# Patient Record
Sex: FEMALE | Race: WHITE | Employment: OTHER | URBAN - METROPOLITAN AREA
[De-identification: names, ages, dates, MRNs, and addresses within clinical notes are randomized per-mention and may not be internally consistent; named-entity substitution may affect disease eponyms.]

---

## 2017-01-03 ENCOUNTER — LAB ENCOUNTER (OUTPATIENT)
Dept: LAB | Facility: HOSPITAL | Age: 82
End: 2017-01-03
Attending: INTERNAL MEDICINE

## 2017-01-03 DIAGNOSIS — N18.30 CHRONIC KIDNEY DISEASE (CKD), STAGE III (MODERATE) (HCC): ICD-10-CM

## 2017-01-03 DIAGNOSIS — E78.2 MIXED HYPERLIPIDEMIA: Primary | ICD-10-CM

## 2017-01-03 LAB
ALBUMIN SERPL BCP-MCNC: 3.2 G/DL (ref 3.5–4.8)
ALBUMIN/GLOB SERPL: 0.8 {RATIO} (ref 1–2)
ALP SERPL-CCNC: 67 U/L (ref 32–100)
ALT SERPL-CCNC: 9 U/L (ref 14–54)
ANION GAP SERPL CALC-SCNC: 9 MMOL/L (ref 0–18)
AST SERPL-CCNC: 16 U/L (ref 15–41)
BASOPHILS # BLD: 0 K/UL (ref 0–0.2)
BASOPHILS NFR BLD: 1 %
BILIRUB SERPL-MCNC: 0.4 MG/DL (ref 0.3–1.2)
BUN SERPL-MCNC: 34 MG/DL (ref 8–20)
BUN/CREAT SERPL: 26.4 (ref 10–20)
CALCIUM SERPL-MCNC: 8.8 MG/DL (ref 8.5–10.5)
CHLORIDE SERPL-SCNC: 106 MMOL/L (ref 95–110)
CHOLEST SERPL-MCNC: 148 MG/DL (ref 110–200)
CO2 SERPL-SCNC: 25 MMOL/L (ref 22–32)
CREAT SERPL-MCNC: 1.29 MG/DL (ref 0.5–1.5)
CREAT UR-MCNC: 95.4 MG/DL
EOSINOPHIL # BLD: 0 K/UL (ref 0–0.7)
EOSINOPHIL NFR BLD: 1 %
ERYTHROCYTE [DISTWIDTH] IN BLOOD BY AUTOMATED COUNT: 15.4 % (ref 11–15)
GLOBULIN PLAS-MCNC: 3.8 G/DL (ref 2.5–3.7)
GLUCOSE SERPL-MCNC: 104 MG/DL (ref 70–99)
HCT VFR BLD AUTO: 35.9 % (ref 35–48)
HDLC SERPL-MCNC: 30 MG/DL
HGB BLD-MCNC: 11.8 G/DL (ref 12–16)
LDLC SERPL CALC-MCNC: 85 MG/DL (ref 0–99)
LYMPHOCYTES # BLD: 1.5 K/UL (ref 1–4)
LYMPHOCYTES NFR BLD: 20 %
MCH RBC QN AUTO: 30.9 PG (ref 27–32)
MCHC RBC AUTO-ENTMCNC: 32.9 G/DL (ref 32–37)
MCV RBC AUTO: 93.8 FL (ref 80–100)
MICROALBUMIN UR-MCNC: 2 MG/DL (ref 0–1.8)
MICROALBUMIN/CREAT UR: 21 MG/G{CREAT} (ref 0–20)
MONOCYTES # BLD: 0.8 K/UL (ref 0–1)
MONOCYTES NFR BLD: 10 %
NEUTROPHILS # BLD AUTO: 5.2 K/UL (ref 1.8–7.7)
NEUTROPHILS NFR BLD: 69 %
NONHDLC SERPL-MCNC: 118 MG/DL
OSMOLALITY UR CALC.SUM OF ELEC: 298 MOSM/KG (ref 275–295)
PLATELET # BLD AUTO: 167 K/UL (ref 140–400)
PMV BLD AUTO: 9.5 FL (ref 7.4–10.3)
POTASSIUM SERPL-SCNC: 4.4 MMOL/L (ref 3.3–5.1)
PROT SERPL-MCNC: 7 G/DL (ref 5.9–8.4)
RBC # BLD AUTO: 3.83 M/UL (ref 3.7–5.4)
SODIUM SERPL-SCNC: 140 MMOL/L (ref 136–144)
TRIGL SERPL-MCNC: 165 MG/DL (ref 1–149)
TSH SERPL-ACNC: 5.78 UIU/ML (ref 0.34–5.6)
WBC # BLD AUTO: 7.5 K/UL (ref 4–11)

## 2017-01-03 PROCEDURE — 82570 ASSAY OF URINE CREATININE: CPT

## 2017-01-03 PROCEDURE — 80053 COMPREHEN METABOLIC PANEL: CPT

## 2017-01-03 PROCEDURE — 82043 UR ALBUMIN QUANTITATIVE: CPT

## 2017-01-03 PROCEDURE — 36415 COLL VENOUS BLD VENIPUNCTURE: CPT

## 2017-01-03 PROCEDURE — 85025 COMPLETE CBC W/AUTO DIFF WBC: CPT

## 2017-01-03 PROCEDURE — 80061 LIPID PANEL: CPT

## 2017-01-03 PROCEDURE — 84443 ASSAY THYROID STIM HORMONE: CPT

## 2017-07-06 ENCOUNTER — LAB REQUISITION (OUTPATIENT)
Dept: LAB | Facility: HOSPITAL | Age: 82
End: 2017-07-06
Payer: MEDICARE

## 2017-07-06 DIAGNOSIS — E78.2 MIXED HYPERLIPIDEMIA: ICD-10-CM

## 2017-07-06 DIAGNOSIS — Z00.01 ENCOUNTER FOR GENERAL ADULT MEDICAL EXAMINATION WITH ABNORMAL FINDINGS: ICD-10-CM

## 2017-07-06 DIAGNOSIS — E03.9 HYPOTHYROIDISM: ICD-10-CM

## 2017-07-06 LAB
ALBUMIN SERPL BCP-MCNC: 3.5 G/DL (ref 3.5–4.8)
ALBUMIN/GLOB SERPL: 0.9 {RATIO} (ref 1–2)
ALP SERPL-CCNC: 60 U/L (ref 32–100)
ALT SERPL-CCNC: 9 U/L (ref 14–54)
ANION GAP SERPL CALC-SCNC: 8 MMOL/L (ref 0–18)
AST SERPL-CCNC: 19 U/L (ref 15–41)
BASOPHILS # BLD: 0 K/UL (ref 0–0.2)
BASOPHILS NFR BLD: 0 %
BILIRUB SERPL-MCNC: 0.6 MG/DL (ref 0.3–1.2)
BUN SERPL-MCNC: 36 MG/DL (ref 8–20)
BUN/CREAT SERPL: 28.3 (ref 10–20)
CALCIUM SERPL-MCNC: 9.3 MG/DL (ref 8.5–10.5)
CHLORIDE SERPL-SCNC: 108 MMOL/L (ref 95–110)
CHOLEST SERPL-MCNC: 138 MG/DL (ref 110–200)
CO2 SERPL-SCNC: 25 MMOL/L (ref 22–32)
CREAT SERPL-MCNC: 1.27 MG/DL (ref 0.5–1.5)
EOSINOPHIL # BLD: 0 K/UL (ref 0–0.7)
EOSINOPHIL NFR BLD: 0 %
ERYTHROCYTE [DISTWIDTH] IN BLOOD BY AUTOMATED COUNT: 16.5 % (ref 11–15)
GLOBULIN PLAS-MCNC: 3.8 G/DL (ref 2.5–3.7)
GLUCOSE SERPL-MCNC: 97 MG/DL (ref 70–99)
HCT VFR BLD AUTO: 35.8 % (ref 35–48)
HDLC SERPL-MCNC: 34 MG/DL
HGB BLD-MCNC: 11.7 G/DL (ref 12–16)
LDLC SERPL CALC-MCNC: 82 MG/DL (ref 0–99)
LYMPHOCYTES # BLD: 1.6 K/UL (ref 1–4)
LYMPHOCYTES NFR BLD: 26 %
MCH RBC QN AUTO: 30.6 PG (ref 27–32)
MCHC RBC AUTO-ENTMCNC: 32.5 G/DL (ref 32–37)
MCV RBC AUTO: 94 FL (ref 80–100)
MONOCYTES # BLD: 0.8 K/UL (ref 0–1)
MONOCYTES NFR BLD: 13 %
NEUTROPHILS # BLD AUTO: 3.6 K/UL (ref 1.8–7.7)
NEUTROPHILS NFR BLD: 60 %
NONHDLC SERPL-MCNC: 104 MG/DL
OSMOLALITY UR CALC.SUM OF ELEC: 300 MOSM/KG (ref 275–295)
PLATELET # BLD AUTO: 142 K/UL (ref 140–400)
PMV BLD AUTO: 10.4 FL (ref 7.4–10.3)
POTASSIUM SERPL-SCNC: 4 MMOL/L (ref 3.3–5.1)
PROT SERPL-MCNC: 7.3 G/DL (ref 5.9–8.4)
RBC # BLD AUTO: 3.81 M/UL (ref 3.7–5.4)
SODIUM SERPL-SCNC: 141 MMOL/L (ref 136–144)
TRIGL SERPL-MCNC: 109 MG/DL (ref 1–149)
TSH SERPL-ACNC: 3.6 UIU/ML (ref 0.45–5.33)
WBC # BLD AUTO: 6 K/UL (ref 4–11)

## 2017-07-06 PROCEDURE — 80061 LIPID PANEL: CPT | Performed by: INTERNAL MEDICINE

## 2017-07-06 PROCEDURE — 80053 COMPREHEN METABOLIC PANEL: CPT | Performed by: INTERNAL MEDICINE

## 2017-07-06 PROCEDURE — 84443 ASSAY THYROID STIM HORMONE: CPT | Performed by: INTERNAL MEDICINE

## 2017-07-06 PROCEDURE — 82306 VITAMIN D 25 HYDROXY: CPT | Performed by: INTERNAL MEDICINE

## 2017-07-06 PROCEDURE — 85025 COMPLETE CBC W/AUTO DIFF WBC: CPT | Performed by: INTERNAL MEDICINE

## 2017-07-07 LAB — 25(OH)D3 SERPL-MCNC: 43.3 NG/ML

## 2018-07-16 ENCOUNTER — HOSPITAL ENCOUNTER (OUTPATIENT)
Dept: ULTRASOUND IMAGING | Facility: HOSPITAL | Age: 83
Discharge: HOME | End: 2018-07-16
Attending: INTERNAL MEDICINE
Payer: MEDICARE

## 2018-07-16 DIAGNOSIS — R60.9 SWELLING: ICD-10-CM

## 2018-07-16 PROCEDURE — 93971 EXTREMITY STUDY: CPT | Performed by: INTERNAL MEDICINE

## 2018-08-13 ENCOUNTER — LAB REQUISITION (OUTPATIENT)
Dept: LAB | Facility: HOSPITAL | Age: 83
End: 2018-08-13
Payer: MEDICARE

## 2018-08-13 DIAGNOSIS — E78.2 MIXED HYPERLIPIDEMIA: ICD-10-CM

## 2018-08-13 DIAGNOSIS — N18.30 CHRONIC KIDNEY DISEASE, STAGE III (MODERATE) (HCC): ICD-10-CM

## 2018-08-13 LAB
ALBUMIN SERPL BCP-MCNC: 3.3 G/DL (ref 3.5–4.8)
ALBUMIN/GLOB SERPL: 0.9 {RATIO} (ref 1–2)
ALP SERPL-CCNC: 55 U/L (ref 32–100)
ALT SERPL-CCNC: 12 U/L (ref 14–54)
ANION GAP SERPL CALC-SCNC: 9 MMOL/L (ref 0–18)
AST SERPL-CCNC: 21 U/L (ref 15–41)
BASOPHILS # BLD: 0.1 K/UL (ref 0–0.2)
BASOPHILS NFR BLD: 1 %
BILIRUB SERPL-MCNC: 0.5 MG/DL (ref 0.3–1.2)
BILIRUB UR QL: NEGATIVE
BUN SERPL-MCNC: 32 MG/DL (ref 8–20)
BUN/CREAT SERPL: 27.6 (ref 10–20)
CALCIUM SERPL-MCNC: 9 MG/DL (ref 8.5–10.5)
CHLORIDE SERPL-SCNC: 107 MMOL/L (ref 95–110)
CHOLEST SERPL-MCNC: 146 MG/DL (ref 110–200)
CO2 SERPL-SCNC: 24 MMOL/L (ref 22–32)
COLOR UR: YELLOW
CREAT SERPL-MCNC: 1.16 MG/DL (ref 0.5–1.5)
CREAT UR-MCNC: 30.8 MG/DL
EOSINOPHIL # BLD: 0 K/UL (ref 0–0.7)
EOSINOPHIL NFR BLD: 1 %
ERYTHROCYTE [DISTWIDTH] IN BLOOD BY AUTOMATED COUNT: 17.6 % (ref 11–15)
GLOBULIN PLAS-MCNC: 3.8 G/DL (ref 2.5–3.7)
GLUCOSE SERPL-MCNC: 98 MG/DL (ref 70–99)
GLUCOSE UR-MCNC: NEGATIVE MG/DL
HCT VFR BLD AUTO: 34.9 % (ref 35–48)
HDLC SERPL-MCNC: 34 MG/DL
HGB BLD-MCNC: 11.4 G/DL (ref 12–16)
HGB UR QL STRIP.AUTO: NEGATIVE
KETONES UR-MCNC: NEGATIVE MG/DL
LDLC SERPL CALC-MCNC: 78 MG/DL (ref 0–99)
LYMPHOCYTES # BLD: 2 K/UL (ref 1–4)
LYMPHOCYTES NFR BLD: 27 %
MCH RBC QN AUTO: 31.2 PG (ref 27–32)
MCHC RBC AUTO-ENTMCNC: 32.8 G/DL (ref 32–37)
MCV RBC AUTO: 95.2 FL (ref 80–100)
MICROALBUMIN UR-MCNC: 1.9 MG/DL (ref 0–1.8)
MICROALBUMIN/CREAT UR: 61.7 MG/G{CREAT} (ref 0–20)
MONOCYTES # BLD: 1 K/UL (ref 0–1)
MONOCYTES NFR BLD: 13 %
NEUTROPHILS # BLD AUTO: 4.4 K/UL (ref 1.8–7.7)
NEUTROPHILS NFR BLD: 59 %
NITRITE UR QL STRIP.AUTO: NEGATIVE
NONHDLC SERPL-MCNC: 112 MG/DL
OSMOLALITY UR CALC.SUM OF ELEC: 297 MOSM/KG (ref 275–295)
PH UR: 6 [PH] (ref 5–8)
PLATELET # BLD AUTO: 148 K/UL (ref 140–400)
PMV BLD AUTO: 10.5 FL (ref 7.4–10.3)
POTASSIUM SERPL-SCNC: 4.1 MMOL/L (ref 3.3–5.1)
PROT SERPL-MCNC: 7.1 G/DL (ref 5.9–8.4)
PROT UR-MCNC: NEGATIVE MG/DL
RBC # BLD AUTO: 3.67 M/UL (ref 3.7–5.4)
RBC #/AREA URNS AUTO: 1 /HPF
SODIUM SERPL-SCNC: 140 MMOL/L (ref 136–144)
SP GR UR STRIP: 1.01 (ref 1–1.03)
TRIGL SERPL-MCNC: 170 MG/DL (ref 1–149)
TSH SERPL-ACNC: 3.43 UIU/ML (ref 0.45–5.33)
UROBILINOGEN UR STRIP-ACNC: <2
VIT C UR-MCNC: NEGATIVE MG/DL
WBC # BLD AUTO: 7.4 K/UL (ref 4–11)
WBC #/AREA URNS AUTO: 64 /HPF

## 2018-08-13 PROCEDURE — 82570 ASSAY OF URINE CREATININE: CPT | Performed by: INTERNAL MEDICINE

## 2018-08-13 PROCEDURE — 84443 ASSAY THYROID STIM HORMONE: CPT | Performed by: INTERNAL MEDICINE

## 2018-08-13 PROCEDURE — 80053 COMPREHEN METABOLIC PANEL: CPT | Performed by: INTERNAL MEDICINE

## 2018-08-13 PROCEDURE — 85025 COMPLETE CBC W/AUTO DIFF WBC: CPT | Performed by: INTERNAL MEDICINE

## 2018-08-13 PROCEDURE — 87086 URINE CULTURE/COLONY COUNT: CPT | Performed by: INTERNAL MEDICINE

## 2018-08-13 PROCEDURE — 81001 URINALYSIS AUTO W/SCOPE: CPT | Performed by: INTERNAL MEDICINE

## 2018-08-13 PROCEDURE — 87088 URINE BACTERIA CULTURE: CPT | Performed by: INTERNAL MEDICINE

## 2018-08-13 PROCEDURE — 80061 LIPID PANEL: CPT | Performed by: INTERNAL MEDICINE

## 2018-08-13 PROCEDURE — 82043 UR ALBUMIN QUANTITATIVE: CPT | Performed by: INTERNAL MEDICINE

## 2018-08-13 PROCEDURE — 87186 SC STD MICRODIL/AGAR DIL: CPT | Performed by: INTERNAL MEDICINE

## 2018-09-23 ENCOUNTER — APPOINTMENT (OUTPATIENT)
Dept: GENERAL RADIOLOGY | Facility: HOSPITAL | Age: 83
DRG: 481 | End: 2018-09-23
Attending: EMERGENCY MEDICINE
Payer: MEDICARE

## 2018-09-23 ENCOUNTER — HOSPITAL ENCOUNTER (INPATIENT)
Facility: HOSPITAL | Age: 83
LOS: 7 days | Discharge: NURSING, REHAB OR LONG-TERM CARE | DRG: 481 | End: 2018-09-30
Attending: EMERGENCY MEDICINE | Admitting: INTERNAL MEDICINE
Payer: MEDICARE

## 2018-09-23 DIAGNOSIS — I82.401 DEEP VEIN THROMBOSIS (DVT) OF RIGHT LOWER EXTREMITY, UNSPECIFIED CHRONICITY, UNSPECIFIED VEIN (HCC): ICD-10-CM

## 2018-09-23 DIAGNOSIS — S72.002A CLOSED FRACTURE OF LEFT HIP, INITIAL ENCOUNTER (HCC): Primary | ICD-10-CM

## 2018-09-23 DIAGNOSIS — D50.9 IRON DEFICIENCY ANEMIA, UNSPECIFIED IRON DEFICIENCY ANEMIA TYPE: ICD-10-CM

## 2018-09-23 PROCEDURE — 71045 X-RAY EXAM CHEST 1 VIEW: CPT | Performed by: EMERGENCY MEDICINE

## 2018-09-23 PROCEDURE — 73502 X-RAY EXAM HIP UNI 2-3 VIEWS: CPT | Performed by: EMERGENCY MEDICINE

## 2018-09-23 RX ORDER — DEXTROSE, SODIUM CHLORIDE, AND POTASSIUM CHLORIDE 5; .45; .15 G/100ML; G/100ML; G/100ML
INJECTION INTRAVENOUS CONTINUOUS
Status: DISCONTINUED | OUTPATIENT
Start: 2018-09-23 | End: 2018-09-24

## 2018-09-23 RX ORDER — SODIUM CHLORIDE 9 MG/ML
INJECTION, SOLUTION INTRAVENOUS ONCE
Status: COMPLETED | OUTPATIENT
Start: 2018-09-23 | End: 2018-09-23

## 2018-09-23 RX ORDER — MORPHINE SULFATE 2 MG/ML
1 INJECTION, SOLUTION INTRAMUSCULAR; INTRAVENOUS EVERY 2 HOUR PRN
Status: DISCONTINUED | OUTPATIENT
Start: 2018-09-23 | End: 2018-09-27

## 2018-09-23 RX ORDER — METOPROLOL SUCCINATE 25 MG/1
25 TABLET, EXTENDED RELEASE ORAL DAILY
COMMUNITY

## 2018-09-23 RX ORDER — LEVOTHYROXINE SODIUM 0.03 MG/1
25 TABLET ORAL
COMMUNITY

## 2018-09-23 RX ORDER — BUPIVACAINE HYDROCHLORIDE 5 MG/ML
30 INJECTION, SOLUTION EPIDURAL; INTRACAUDAL ONCE
Status: COMPLETED | OUTPATIENT
Start: 2018-09-23 | End: 2018-09-23

## 2018-09-23 RX ORDER — ARIPIPRAZOLE 15 MG/1
10 TABLET ORAL DAILY
Status: ON HOLD | COMMUNITY
End: 2018-09-30

## 2018-09-23 RX ORDER — LISINOPRIL 10 MG/1
10 TABLET ORAL DAILY
Status: ON HOLD | COMMUNITY
End: 2018-09-30

## 2018-09-23 NOTE — ED INITIAL ASSESSMENT (HPI)
Patient was bending over when she fell onto her left hip. Patient complains of left hip pain.   +shortening and rotation

## 2018-09-24 RX ORDER — SODIUM CHLORIDE 9 MG/ML
INJECTION, SOLUTION INTRAVENOUS ONCE
Status: COMPLETED | OUTPATIENT
Start: 2018-09-24 | End: 2018-09-24

## 2018-09-24 RX ORDER — 0.9 % SODIUM CHLORIDE 0.9 %
VIAL (ML) INJECTION
Status: COMPLETED
Start: 2018-09-24 | End: 2018-09-24

## 2018-09-24 RX ORDER — ARIPIPRAZOLE 15 MG/1
10 TABLET ORAL DAILY
Status: DISCONTINUED | OUTPATIENT
Start: 2018-09-24 | End: 2018-09-30

## 2018-09-24 RX ORDER — LEVOTHYROXINE SODIUM 0.03 MG/1
25 TABLET ORAL
Status: DISCONTINUED | OUTPATIENT
Start: 2018-09-24 | End: 2018-09-30

## 2018-09-24 RX ORDER — ENOXAPARIN SODIUM 100 MG/ML
30 INJECTION SUBCUTANEOUS EVERY 24 HOURS
Status: DISCONTINUED | OUTPATIENT
Start: 2018-09-24 | End: 2018-09-26

## 2018-09-24 RX ORDER — DEXTROSE MONOHYDRATE 25 G/50ML
50 INJECTION, SOLUTION INTRAVENOUS AS NEEDED
Status: DISCONTINUED | OUTPATIENT
Start: 2018-09-24 | End: 2018-09-30

## 2018-09-24 RX ORDER — METOPROLOL SUCCINATE 25 MG/1
25 TABLET, EXTENDED RELEASE ORAL DAILY
Status: DISCONTINUED | OUTPATIENT
Start: 2018-09-24 | End: 2018-09-30

## 2018-09-24 RX ORDER — SODIUM CHLORIDE 9 MG/ML
INJECTION, SOLUTION INTRAVENOUS
Status: COMPLETED
Start: 2018-09-24 | End: 2018-09-24

## 2018-09-24 RX ORDER — DEXTROSE MONOHYDRATE 25 G/50ML
50 INJECTION, SOLUTION INTRAVENOUS AS NEEDED
Status: DISCONTINUED | OUTPATIENT
Start: 2018-09-24 | End: 2018-09-27

## 2018-09-24 NOTE — H&P
[facility]    PATIENT'S NAME: Ernesto Whyte   ATTENDING PHYSICIAN: Aureliano Mina MD   PATIENT ACCOUNT#:   [id number]    LOCATION:  [address] [place] [address] RECORD #:   [id number]       YOB: 1914  ADMISSION DATE:       09/23/201 HEENT:  Head:  Atraumatic. Eyes:  EOMI. PERRL. Mouth:  Tongue and uvula midline without erythema or exudate. NECK:  Supple without lymphadenopathy or bruits. LUNGS:  Clear to auscultation. HEART:  Regular rhythm.   S1 and S2 normal.   ABDOMEN: treatment. Dictated By Steven Montilla MD  d: 09/24/2018 07:13:34  t: 09/24/2018 07:19:45  [facility] [id number]  [facility]

## 2018-09-24 NOTE — CONSULTS
[facility]    PATIENT'S NAME: Jasonville Nannette   ATTENDING PHYSICIAN: Aureliano Parada MD   CONSULTING PHYSICIAN: Domenico Crowe MD   PATIENT ACCOUNT#:   [id number]    LOCATION:  [address] [place] [address] #:   [id number]       DATE OF BIR Decreased range of motion of the ankle with regard to dorsiflexion and plantar flexion. Good color and capillary refill are noted. LABORATORY DATA:  Her white blood count is 9.2, hemoglobin 9.7, hematocrit 28.9, platelet count is 755. Her INR is 1.5. cardiopulmonary, GI/, and/or cerebral problems including stroke despite medical and anesthesia clearance; anesthetic-related complications despite anesthesia clearance.; the need for prolonged protective weightbearing and rehabilitation, etc., and absolu

## 2018-09-24 NOTE — CM/SW NOTE
NEHAL met with the patient and her daughter  at bedside. The patient lives alone in a single story house, 1 step to enter. The patient responds being independent prior to admission with all ADL's and ambulation. Patient denies use of any DME.  The patient has

## 2018-09-24 NOTE — ED PROVIDER NOTES
Patient Seen in: [facility] AND [facility] Emergency Department    History   Patient presents with:  Hip Pain      HPI    Patient presents to the ED complaining of left hip pain after falling in her kitchen today.   She states she was bending over to  some Substance and Sexual Activity      Alcohol use: Not on file      Drug use: Not on file      Sexual activity: Not on file    Other Topics      Concerns:        Not on file    Social History Narrative      Not on file      ROS  Pertinent Positives: Hip pain 1.8 (*)     All other components within normal limits   URINALYSIS WITH CULTURE REFLEX - Abnormal; Notable for the following components:    Blood Urine Small (*)     Leukocyte Esterase Urine Moderate (*)     WBC Urine 29 (*)     Bacteria Urine Moderate (*) Margi Alvares MD on 9/23/2018 at 19:30     Approved by (CST): Margi Alvares MD on 9/23/2018 at 19:34          ED Medications Administered:   Medications   dextrose 5 % and 0.45 % NaCl with KCl 20 mEq infusion ( Intravenous New Bag 9/23/ the procedure well, performed by myself. Ultrasound images stored on the ultrasound database.     Condition upon leaving the department: Stable    Disposition and Plan     Clinical Impression:  Closed fracture of left hip, initial encounter ([facility] [address]  (primary

## 2018-09-25 RX ORDER — SODIUM CHLORIDE 9 MG/ML
INJECTION, SOLUTION INTRAVENOUS CONTINUOUS
Status: DISCONTINUED | OUTPATIENT
Start: 2018-09-26 | End: 2018-09-30

## 2018-09-25 RX ORDER — 0.9 % SODIUM CHLORIDE 0.9 %
VIAL (ML) INJECTION
Status: COMPLETED
Start: 2018-09-25 | End: 2018-09-25

## 2018-09-25 NOTE — CM/SW NOTE
SW met with patient and her family at bedside. List of rehab facilities provided to them again. Daughter is stating that she will make a decision regarding rehab facility tomorrow.  Initially the referral has been sent to [facility] ([facility]), patient and

## 2018-09-25 NOTE — PLAN OF CARE
DISCHARGE PLANNING    • Discharge to home or other facility with appropriate resources Progressing    sw on case.  Plan [facility] vs [facility]-awaiting ana lilia        HEMATOLOGIC - ADULT    • Maintains hematologic stability Progressing    • Free from bleedin pm w/Dr. Tommie Frances

## 2018-09-25 NOTE — PROGRESS NOTES
[facility]    Progress Note    Wilber Noyola Patient Status:  Inpatient    1914 MRN [id number]   Location [facility] 4W/SW/SE Attending Rodger Agustin MD   Hosp Day # 2 PCP Liiva Briones MD       Subjective:   Wilber Noyola is ALT   --   11*   --    BILT   --   0.5   --    TP   --   5.8*   --        No results for input(s): LIP, PATRICA in the last 168 hours. No results for input(s): TROP, CK in the last 168 hours.     Recent Labs   Lab  09/23/18   1846  09/24/18   0445   INR  1 09/24/2018 38 (L) >=60 Final   09/23/2018 41 (L) >=60 Final   08/13/2018 38 (L) >=60 Final   07/06/2017 38 (L) >=60 Final   01/03/2017 38 (L) >=60 Final     WBC   Date Value Ref Range Status   09/25/2018 8.3 4.0 - 11.0 K/UL Final   09/24/2018 9.2 4.0 - 1 Rhythm Low voltage in precordial leads.  ABNORMAL When compared with ECG of 05/01/2009 03:14:33 No significant changes have occurred Electronically signed on 09/23/2018 at 21:07 by Reyna Jeter MD  9/25/2018

## 2018-09-25 NOTE — CM/SW NOTE
CTL note: Current working DRG is 536. CTL will not enroll in Episode Connect - pt is on OR schedule for 9/26 pending medical and anesthesia clearance. CTL will continue to monitor working  DRG.

## 2018-09-25 NOTE — PROGRESS NOTES
[facility] Obstetrics and Gynecology    [address] [place] [address]    Phone:  [phone]       Thank You for choosing us for your health care visit. We are glad to serve you and happy to provide you with this summary of your visit. Please help us to ensure we have accurate records. If you find anything that needs to be changed, please let our staff know as soon as possible.          Your Demographic Information     Patient Name Sex     Coleen Owen Female 1969       Ethnic Group Patient Race    Not of  or  Origin White      Your Visit Details     Date & Time Provider Department    2017 4:00 PM Kylee Schulz,  [facility]etrics [facility]      Your Upcoming Appointment*(Max 10)     Wednesday May 31, 2017  3:00 PM CDT   IV Therapy with [facility] CHEMO CHAIR 1, [facility] INFUSION RN   [facility] ([facility])    [address] [place] [address]   [phone]            2017 12:30 PM CDT   US PELVIS COMPLETE with OSW US MFM/OB 1   [facility] Ultrasound - Obstetrics ([facility])    [address] [place] [address] [phone]            2017  2:30 PM CDT   Follow-up Visit with Sandra Haynes MD   [facility] ([facility])    [address] [place] [address]   [phone]              Your To Do List     Future Orders Please Complete On or Around Expires    US PELVIS COMPLETE NON OB  May 26, 2017 Aug 24, 2017      Conditions Discussed Today or Order-Related Diagnoses        Comments    Menorrhagia with regular cycle    -  Primary       Your Vitals Were     BP Pulse Height Weight LMP BMI    134/82 90 5' 3\" (1.6 m) 231 lb 0.7 oz (104.8 kg) 2017 (Exact Date) 40.93 kg/m2    Smoking Status                   Former Smoker      [facility]    Progress Note    Monica Head Patient Status:  Inpatient    1914 MRN [id number]   Location [facility] 4W/SW/SE Attending Jose Luis Chaudhary MD   Hosp Day # 2 PCP Jose M Hernandez MD     Date of Admission:  2018       Medications Prescribed or Re-Ordered Today     None      Your Current Medications Are        Disp Refills Start End    sumatriptan (IMITREX) 100 MG tablet 9 tablet 0 5/1/2017     Sig - Route: Take 1 tablet by mouth as needed for Migraine. - Oral    Class: Eprescribe    FLUoxetine (PROZAC) 20 MG capsule 270 capsule 0 5/1/2017     Sig - Route: Take 3 capsules by mouth daily. - Oral    Class: Eprescribe    busPIRone (BUSPAR) 15 MG tablet 84 tablet 0 5/24/2017     Sig: Take one tab twice a day times one week po. Then increase to two tabs twice per day.    Class: Eprescribe      Allergies     Sulfa Antibiotics       Immunizations History as of 5/26/2017     Name Date    Influenza 11/20/2007    Influenza A novel H1N1 12/17/2009    PPD 9/8/2015  3:00 PM    Tdap 4/19/2012      Problem List as of 5/26/2017     Anemia    Depression    Fatigue    ARGENIS (iron deficiency anemia)    Obesity    Back strain    Anxiety disorder    Migraines    Iron deficiency anemia due to chronic blood loss              Patient Instructions    Novasure endometrial ablation      Endometrial Ablation  Endometrial ablation is an outpatient surgery that can reduce or stop heavy menstrual bleeding. Ablation destroys the lining of the uterus. This surgery is for women who do not want to have any more children and who have not yet entered menopause. It should not be used by women with endometrial hyperplasia or cancer of the uterus.  Treatment takes less than an hour, and you can go home later that day.  Preparing for surgery  · You may be given medicine by mouth or injection for a few weeks or months before your ablation. This thins the lining of the uterus and reduces bleeding.  · Your health care provider may recommend other procedures to check the inside of your uterus before the ablation is done.   · The day before surgery, you may be given medicine or a special substance (laminaria) may be put into your cervix (the opening to the uterus). This  (DEX4) oral liquid 15 g 15 g Oral Q15 Min PRN   Insulin Aspart Pen (NOVOLOG) 100 UNIT/ML flexpen 1-5 Units 1-5 Units Subcutaneous TID CC   Enoxaparin Sodium (LOVENOX) 30 MG/0.3ML injection 30 mg 30 mg Subcutaneous Q24H   [COMPLETED] Sodium Chloride 0.9 % s Dictated by (CST): Nagi Alvares MD on 9/23/2018 at 19:34     Approved by (CST): Nagi Alvares MD on 9/23/2018 at 19:35          Xr Hip W Or Wo Pelvis 2 Or 3 Views, Left (cpt=73502)    Result Date: 9/23/2018  CONCLUSION:  1.  Alyssa Enrique widens the opening.  · To help prevent problems with anesthesia, do not eat or drink anything 10 hours before surgery.  Your surgery     Destroying the lining with heat, freezing, or electric current prevents the lining from growing back.      · You’ll be given anesthesia so you stay comfortable and relaxed and feel no pain during surgery.  · Then, your uterus may be filled with fluid. This puts pressure on the lining to help reduce bleeding. It also allows your health care provider to see inside your uterus.  · Next your health care provider puts a small telescope-like instrument through the cervix. This scope may be connected to a video monitor. This helps your health care provider see and control the ablation process. At the end of the scope, a device using heat, freezing, or electric current destroys the uterine lining. Instead of the scope, your health care provider may use a device that both expands and destroys the uterine lining. After being inserted into your uterus, it also uses heat or other energy to destroy the lining. Your health care provider will choose the device that’s best for you.  Your recovery  · You may have cramping or aching in your abdomen after surgery. Your health care provider can give you pain medicine.  · You may also have a bloody or watery discharge or bleeding for days or weeks. Use sanitary pads, not tampons.  · Don’t have sexual intercourse or play active sports for 2 weeks after surgery.  · You can likely return to work in 2 days.  · Ask your health care provider about using contraception after an ablation.  · Your health care provider will see you in about 6 weeks to be sure you’re healing well.  Call your health care provider if you have any of the following after surgery:  · Persistent or increased abdominal pain  · Shortness of breath  · Heavy vaginal bleeding  · Fever over 100.4°F (38°C) or chills  · Nausea  · Frequent urination for 24 hours   © 6917-4099 [facility]  [employer], [employer]. [address] [place] [address], [place] [address]. All rights reserved. This information is not intended as a substitute for professional medical care. Always follow your healthcare professional's instructions.

## 2018-09-26 ENCOUNTER — ANESTHESIA (OUTPATIENT)
Dept: SURGERY | Facility: HOSPITAL | Age: 83
DRG: 481 | End: 2018-09-26
Payer: MEDICARE

## 2018-09-26 ENCOUNTER — APPOINTMENT (OUTPATIENT)
Dept: GENERAL RADIOLOGY | Facility: HOSPITAL | Age: 83
DRG: 481 | End: 2018-09-26
Attending: ORTHOPAEDIC SURGERY
Payer: MEDICARE

## 2018-09-26 ENCOUNTER — ANESTHESIA EVENT (OUTPATIENT)
Dept: SURGERY | Facility: HOSPITAL | Age: 83
DRG: 481 | End: 2018-09-26
Payer: MEDICARE

## 2018-09-26 PROCEDURE — 73502 X-RAY EXAM HIP UNI 2-3 VIEWS: CPT | Performed by: ORTHOPAEDIC SURGERY

## 2018-09-26 PROCEDURE — 76001 XR C-ARM FLUORO >1 HOUR  (CPT=76001): CPT | Performed by: ORTHOPAEDIC SURGERY

## 2018-09-26 PROCEDURE — 2W6MX0Z TRACTION OF LEFT LOWER EXTREMITY USING TRACTION APPARATUS: ICD-10-PCS | Performed by: ORTHOPAEDIC SURGERY

## 2018-09-26 PROCEDURE — 0QS706Z REPOSITION LEFT UPPER FEMUR WITH INTRAMEDULLARY INTERNAL FIXATION DEVICE, OPEN APPROACH: ICD-10-PCS | Performed by: ORTHOPAEDIC SURGERY

## 2018-09-26 PROCEDURE — 30233N1 TRANSFUSION OF NONAUTOLOGOUS RED BLOOD CELLS INTO PERIPHERAL VEIN, PERCUTANEOUS APPROACH: ICD-10-PCS | Performed by: INTERNAL MEDICINE

## 2018-09-26 DEVICE — SCREW BN 5MM 4.3MM 38MM NLEX: Type: IMPLANTABLE DEVICE | Site: HIP | Status: FUNCTIONAL

## 2018-09-26 RX ORDER — ACETAMINOPHEN 325 MG/1
650 TABLET ORAL ONCE
Status: COMPLETED | OUTPATIENT
Start: 2018-09-26 | End: 2018-09-26

## 2018-09-26 RX ORDER — LIDOCAINE HYDROCHLORIDE 10 MG/ML
INJECTION, SOLUTION EPIDURAL; INFILTRATION; INTRACAUDAL; PERINEURAL AS NEEDED
Status: DISCONTINUED | OUTPATIENT
Start: 2018-09-26 | End: 2018-09-26 | Stop reason: SURG

## 2018-09-26 RX ORDER — PHENYLEPHRINE HCL 10 MG/ML
VIAL (ML) INJECTION AS NEEDED
Status: DISCONTINUED | OUTPATIENT
Start: 2018-09-26 | End: 2018-09-26 | Stop reason: SURG

## 2018-09-26 RX ORDER — NALOXONE HYDROCHLORIDE 0.4 MG/ML
80 INJECTION, SOLUTION INTRAMUSCULAR; INTRAVENOUS; SUBCUTANEOUS AS NEEDED
Status: DISCONTINUED | OUTPATIENT
Start: 2018-09-26 | End: 2018-09-26 | Stop reason: HOSPADM

## 2018-09-26 RX ORDER — DEXTROSE MONOHYDRATE 25 G/50ML
50 INJECTION, SOLUTION INTRAVENOUS
Status: DISCONTINUED | OUTPATIENT
Start: 2018-09-26 | End: 2018-09-26 | Stop reason: HOSPADM

## 2018-09-26 RX ORDER — HYDROCODONE BITARTRATE AND ACETAMINOPHEN 5; 325 MG/1; MG/1
2 TABLET ORAL AS NEEDED
Status: DISCONTINUED | OUTPATIENT
Start: 2018-09-26 | End: 2018-09-26 | Stop reason: HOSPADM

## 2018-09-26 RX ORDER — SODIUM CHLORIDE, SODIUM LACTATE, POTASSIUM CHLORIDE, CALCIUM CHLORIDE 600; 310; 30; 20 MG/100ML; MG/100ML; MG/100ML; MG/100ML
INJECTION, SOLUTION INTRAVENOUS CONTINUOUS
Status: DISCONTINUED | OUTPATIENT
Start: 2018-09-26 | End: 2018-09-26 | Stop reason: HOSPADM

## 2018-09-26 RX ORDER — DEXAMETHASONE SODIUM PHOSPHATE 4 MG/ML
VIAL (ML) INJECTION AS NEEDED
Status: DISCONTINUED | OUTPATIENT
Start: 2018-09-26 | End: 2018-09-26 | Stop reason: SURG

## 2018-09-26 RX ORDER — CLINDAMYCIN PHOSPHATE 150 MG/ML
INJECTION, SOLUTION INTRAVENOUS AS NEEDED
Status: DISCONTINUED | OUTPATIENT
Start: 2018-09-26 | End: 2018-09-26 | Stop reason: SURG

## 2018-09-26 RX ORDER — FUROSEMIDE 10 MG/ML
20 INJECTION INTRAMUSCULAR; INTRAVENOUS ONCE
Status: COMPLETED | OUTPATIENT
Start: 2018-09-26 | End: 2018-09-26

## 2018-09-26 RX ORDER — SODIUM CHLORIDE 0.9 % (FLUSH) 0.9 %
10 SYRINGE (ML) INJECTION AS NEEDED
Status: DISCONTINUED | OUTPATIENT
Start: 2018-09-26 | End: 2018-09-27

## 2018-09-26 RX ORDER — METOPROLOL TARTRATE 5 MG/5ML
2.5 INJECTION INTRAVENOUS ONCE
Status: DISCONTINUED | OUTPATIENT
Start: 2018-09-26 | End: 2018-09-26 | Stop reason: HOSPADM

## 2018-09-26 RX ORDER — MORPHINE SULFATE 10 MG/ML
6 INJECTION, SOLUTION INTRAMUSCULAR; INTRAVENOUS EVERY 10 MIN PRN
Status: DISCONTINUED | OUTPATIENT
Start: 2018-09-26 | End: 2018-09-26 | Stop reason: HOSPADM

## 2018-09-26 RX ORDER — SODIUM CHLORIDE 9 MG/ML
INJECTION, SOLUTION INTRAVENOUS ONCE
Status: COMPLETED | OUTPATIENT
Start: 2018-09-26 | End: 2018-09-26

## 2018-09-26 RX ORDER — EPHEDRINE SULFATE 50 MG/ML
INJECTION, SOLUTION INTRAVENOUS AS NEEDED
Status: DISCONTINUED | OUTPATIENT
Start: 2018-09-26 | End: 2018-09-26 | Stop reason: SURG

## 2018-09-26 RX ORDER — HALOPERIDOL 5 MG/ML
0.25 INJECTION INTRAMUSCULAR ONCE AS NEEDED
Status: DISCONTINUED | OUTPATIENT
Start: 2018-09-26 | End: 2018-09-26 | Stop reason: HOSPADM

## 2018-09-26 RX ORDER — DIPHENHYDRAMINE HCL 25 MG
25 CAPSULE ORAL ONCE
Status: COMPLETED | OUTPATIENT
Start: 2018-09-26 | End: 2018-09-26

## 2018-09-26 RX ORDER — MORPHINE SULFATE 4 MG/ML
4 INJECTION, SOLUTION INTRAMUSCULAR; INTRAVENOUS EVERY 10 MIN PRN
Status: DISCONTINUED | OUTPATIENT
Start: 2018-09-26 | End: 2018-09-26 | Stop reason: HOSPADM

## 2018-09-26 RX ORDER — ONDANSETRON 2 MG/ML
INJECTION INTRAMUSCULAR; INTRAVENOUS AS NEEDED
Status: DISCONTINUED | OUTPATIENT
Start: 2018-09-26 | End: 2018-09-26 | Stop reason: SURG

## 2018-09-26 RX ORDER — HYDROCODONE BITARTRATE AND ACETAMINOPHEN 5; 325 MG/1; MG/1
1 TABLET ORAL AS NEEDED
Status: DISCONTINUED | OUTPATIENT
Start: 2018-09-26 | End: 2018-09-26 | Stop reason: HOSPADM

## 2018-09-26 RX ORDER — SODIUM CHLORIDE 9 MG/ML
INJECTION, SOLUTION INTRAVENOUS
Status: COMPLETED
Start: 2018-09-26 | End: 2018-09-26

## 2018-09-26 RX ORDER — ONDANSETRON 2 MG/ML
4 INJECTION INTRAMUSCULAR; INTRAVENOUS ONCE AS NEEDED
Status: DISCONTINUED | OUTPATIENT
Start: 2018-09-26 | End: 2018-09-26 | Stop reason: HOSPADM

## 2018-09-26 RX ORDER — MORPHINE SULFATE 4 MG/ML
2 INJECTION, SOLUTION INTRAMUSCULAR; INTRAVENOUS EVERY 10 MIN PRN
Status: DISCONTINUED | OUTPATIENT
Start: 2018-09-26 | End: 2018-09-26 | Stop reason: HOSPADM

## 2018-09-26 RX ADMIN — CLINDAMYCIN PHOSPHATE 600 MG: 150 INJECTION, SOLUTION INTRAVENOUS at 20:15:00

## 2018-09-26 RX ADMIN — PHENYLEPHRINE HCL 100 MCG: 10 MG/ML VIAL (ML) INJECTION at 20:15:00

## 2018-09-26 RX ADMIN — EPHEDRINE SULFATE 5 MG: 50 INJECTION, SOLUTION INTRAVENOUS at 20:50:00

## 2018-09-26 RX ADMIN — SODIUM CHLORIDE: 9 INJECTION, SOLUTION INTRAVENOUS at 21:30:00

## 2018-09-26 RX ADMIN — ONDANSETRON 4 MG: 2 INJECTION INTRAMUSCULAR; INTRAVENOUS at 19:35:00

## 2018-09-26 RX ADMIN — DEXAMETHASONE SODIUM PHOSPHATE 4 MG: 4 MG/ML VIAL (ML) INJECTION at 19:35:00

## 2018-09-26 RX ADMIN — PHENYLEPHRINE HCL 100 MCG: 10 MG/ML VIAL (ML) INJECTION at 20:05:00

## 2018-09-26 RX ADMIN — EPHEDRINE SULFATE 5 MG: 50 INJECTION, SOLUTION INTRAVENOUS at 20:15:00

## 2018-09-26 RX ADMIN — SODIUM CHLORIDE: 9 INJECTION, SOLUTION INTRAVENOUS at 19:21:00

## 2018-09-26 RX ADMIN — EPHEDRINE SULFATE 5 MG: 50 INJECTION, SOLUTION INTRAVENOUS at 19:35:00

## 2018-09-26 RX ADMIN — PHENYLEPHRINE HCL 100 MCG: 10 MG/ML VIAL (ML) INJECTION at 19:35:00

## 2018-09-26 RX ADMIN — SODIUM CHLORIDE: 9 INJECTION, SOLUTION INTRAVENOUS at 22:25:00

## 2018-09-26 RX ADMIN — LIDOCAINE HYDROCHLORIDE 50 MG: 10 INJECTION, SOLUTION EPIDURAL; INFILTRATION; INTRACAUDAL; PERINEURAL at 19:26:00

## 2018-09-26 RX ADMIN — EPHEDRINE SULFATE 5 MG: 50 INJECTION, SOLUTION INTRAVENOUS at 20:05:00

## 2018-09-26 RX ADMIN — SODIUM CHLORIDE: 9 INJECTION, SOLUTION INTRAVENOUS at 22:39:00

## 2018-09-26 RX ADMIN — PHENYLEPHRINE HCL 100 MCG: 10 MG/ML VIAL (ML) INJECTION at 20:50:00

## 2018-09-26 NOTE — CM/SW NOTE
SW followed up with patient and her family regarding rehab choice. They would like the patient to go to rehab to [facility] in [place]. Referral sent to them.      Kymberly Campo [facility] ext [phone]

## 2018-09-26 NOTE — PROGRESS NOTES
[facility] - [facility]    Progress Note    Bobo Caryharper Patient Status:  Inpatient    1914 MRN [id number]   Location [facility] 4W/SW/SE Attending Livier Esposito MD   Hosp Day # 3 PCP Belle Madden MD       Subjective:   Bobo Garcia is 3.9   CL  111*  113*  114*   CO2  25  23  23   ALKPHO  48   --    --    AST  22   --    --    ALT  11*   --    --    BILT  0.5   --    --    TP  5.8*   --    --        No results for input(s): LIP, PATRICA in the last 168 hours.     No results for input(s): TRO (L) >=60 Final   09/23/2018 41 (L) >=60 Final   08/13/2018 38 (L) >=60 Final   07/06/2017 38 (L) >=60 Final     WBC   Date Value Ref Range Status   09/26/2018 8.8 4.0 - 11.0 K/UL Final   09/25/2018 8.3 4.0 - 11.0 K/UL Final   09/24/2018 9.2 4.0 - 11.0 K/UL

## 2018-09-26 NOTE — PLAN OF CARE
DISCHARGE PLANNING    • Discharge to home or other facility with appropriate resources Progressing        HEMATOLOGIC - ADULT    • Maintains hematologic stability Progressing    • Free from bleeding injury Progressing        Impaired Activities of Daily Ced Smith

## 2018-09-26 NOTE — PLAN OF CARE
DISCHARGE PLANNING    • Discharge to home or other facility with appropriate resources Progressing    sw on case. From home alone-independent prior to fall.  Referral made to [facility] in [address] [place] [address] hematologic stabilit INTEGRITY - ADULT    • Skin integrity remains intact Progressing    Left shoulder-abrasion-shearing/mepielx in place-bruising to left hip/left arm  Reposition as able-limited due to fracture and bucks traction          Alma Retort is from home s/p fall.  Admitted

## 2018-09-27 PROCEDURE — 99223 1ST HOSP IP/OBS HIGH 75: CPT | Performed by: INTERNAL MEDICINE

## 2018-09-27 RX ORDER — MORPHINE SULFATE 2 MG/ML
2 INJECTION, SOLUTION INTRAMUSCULAR; INTRAVENOUS EVERY 2 HOUR PRN
Status: DISCONTINUED | OUTPATIENT
Start: 2018-09-27 | End: 2018-09-30

## 2018-09-27 RX ORDER — ONDANSETRON 2 MG/ML
4 INJECTION INTRAMUSCULAR; INTRAVENOUS EVERY 6 HOURS PRN
Status: DISCONTINUED | OUTPATIENT
Start: 2018-09-27 | End: 2018-09-30

## 2018-09-27 RX ORDER — HYDROCODONE BITARTRATE AND ACETAMINOPHEN 5; 325 MG/1; MG/1
2 TABLET ORAL EVERY 4 HOURS PRN
Status: DISCONTINUED | OUTPATIENT
Start: 2018-09-27 | End: 2018-09-30

## 2018-09-27 RX ORDER — ACETAMINOPHEN 325 MG/1
650 TABLET ORAL EVERY 4 HOURS PRN
Status: DISCONTINUED | OUTPATIENT
Start: 2018-09-27 | End: 2018-09-30

## 2018-09-27 RX ORDER — FUROSEMIDE 10 MG/ML
20 INJECTION INTRAMUSCULAR; INTRAVENOUS ONCE
Status: COMPLETED | OUTPATIENT
Start: 2018-09-27 | End: 2018-09-27

## 2018-09-27 RX ORDER — METOCLOPRAMIDE HYDROCHLORIDE 5 MG/ML
5 INJECTION INTRAMUSCULAR; INTRAVENOUS EVERY 6 HOURS PRN
Status: DISCONTINUED | OUTPATIENT
Start: 2018-09-27 | End: 2018-09-30

## 2018-09-27 RX ORDER — MORPHINE SULFATE 2 MG/ML
1 INJECTION, SOLUTION INTRAMUSCULAR; INTRAVENOUS EVERY 2 HOUR PRN
Status: DISCONTINUED | OUTPATIENT
Start: 2018-09-27 | End: 2018-09-30

## 2018-09-27 RX ORDER — MORPHINE SULFATE 2 MG/ML
INJECTION, SOLUTION INTRAMUSCULAR; INTRAVENOUS
Status: COMPLETED
Start: 2018-09-27 | End: 2018-09-27

## 2018-09-27 RX ORDER — MORPHINE SULFATE 4 MG/ML
4 INJECTION, SOLUTION INTRAMUSCULAR; INTRAVENOUS EVERY 2 HOUR PRN
Status: DISCONTINUED | OUTPATIENT
Start: 2018-09-27 | End: 2018-09-30

## 2018-09-27 RX ORDER — SODIUM CHLORIDE 0.9 % (FLUSH) 0.9 %
10 SYRINGE (ML) INJECTION AS NEEDED
Status: DISCONTINUED | OUTPATIENT
Start: 2018-09-27 | End: 2018-09-30

## 2018-09-27 RX ORDER — HYDROCODONE BITARTRATE AND ACETAMINOPHEN 5; 325 MG/1; MG/1
1 TABLET ORAL EVERY 4 HOURS PRN
Status: DISCONTINUED | OUTPATIENT
Start: 2018-09-27 | End: 2018-09-30

## 2018-09-27 NOTE — BRIEF OP NOTE
[facility]   Brief Op Note    Patients Name: Tone Folds  Attending Physician: George Meneses MD  Operating Physician: Anil Smith MD  CSN: [id number]     Location:  OR  MRN: [id number]    YOB: 1914  Admission Yaya

## 2018-09-27 NOTE — ANESTHESIA PROCEDURE NOTES
ANESTHESIA INTUBATION  Date/Time: 9/26/2018 7:28 PM  Urgency: elective    Airway not difficult    General Information and Staff    Patient location during procedure: OR  Anesthesiologist: Shania Redmond MD  Performed: anesthesiologist     Indications

## 2018-09-27 NOTE — ANESTHESIA PREPROCEDURE EVALUATION
Anesthesia PreOp Note    HPI:     Heath George is a 8 year old female who presents for preoperative consultation requested by: Sadie Florez MD    Date of Surgery: 9/23/2018 - 9/26/2018    Procedure(s):  ORIF INTER/SUB TROCH FRACTURE  Indication: in (DECADRON) 4 MG/ML injection  Intravenous PRN Roni Greer MD 4 mg at 09/26/18 1935   ondansetron HCl (ZOFRAN) injection  Intravenous PRN Roni Greer MD 4 mg at 09/26/18 1935   phenylephrine HCl (PIETRO-SYNEPHRINE) 10 MG/ML injection   [facility] 4 mg 4 mg Intravenous Once PRN Kaylynn Walters MD    Prochlorperazine Edisylate (COMPAZINE) injection 5 mg 5 mg Intravenous Once PRN Kaylynn Walters MD    haloperidol lactate (HALDOL) 5 MG/ML injection 0.25 mg 0.25 mg Intraveno family history on file. Social History    Socioeconomic History      Marital status:        Spouse name: Not on file      Number of children: Not on file      Years of education: Not on file      Highest education level: Not on file    Social Needs (36.6 °C) 97.4 °F (36.3 °C)    TempSrc: Oral Oral Oral    SpO2: 95% 95% 95% 92%   Weight:       Height:            Anesthesia ROS/Med Hx and Physical Exam     Patient summary reviewed and Nursing notes reviewed    No history of anesthetic complications   A

## 2018-09-27 NOTE — PROGRESS NOTES
[facility] Pharmacy Note:  Renal Dose Adjustment for Metoclopramide (REGLAN)    Abbey Hammond has been prescribed Metoclopramide (REGLAN) 10 mg every 6 hours as needed for Nausea. vomiting.     Estimated Creatinine Clearance: 18.8 mL/min (based on SCr of 1.08 mg/dL

## 2018-09-27 NOTE — PROGRESS NOTES
[facility]    Progress Note    Abilioshad Davidson Patient Status:  Inpatient    1914 MRN [id number]   Location [facility] 4W/SW/SE Attending Jasmin Quinones MD   Hosp Day # 4 PCP Emanuel Sims MD       Subjective:   Marissamadihanahedshad Cedeño is 2499  09/25/18   0521  09/26/18   0523   GLU  194*  116*  102*   BUN  39*  28*  25*   CREATSERUM  1.14  1.03  1.08   GFRAA  44*  50*  47*   GFRNAA  38*  43*  41*   CA  8.5  8.3*  8.3*   ALB  2.8*   --    --    NA  142  142  141   K  4.2  4.0  3.9   CL  111 not suitable for acute renal failure patients and it is not recommended for use with pregnant women.      GFR, Non-   Date Value Ref Range Status   09/26/2018 41 (L) >=60 Final   09/25/2018 43 (L) >=60 Final   09/24/2018 38 (L) >=60 Final Intramedullary magnus and nail placement with fluoroscopic guidance.     Dictated by (CST): Brandi Busch MD on 9/27/2018 at 6:29     Approved by (CST): Brandi Busch MD on 9/27/2018 at 6:31                  Edilma Leavitt MD  9/27/2018

## 2018-09-27 NOTE — PHYSICAL THERAPY NOTE
PHYSICAL THERAPY EVALUATION - INPATIENT     Room Number: 430/430-A  Evaluation Date: 9/27/2018  Type of Evaluation: Initial   Physician Order: PT Eval and Treat    Presenting Problem: L hip intramedullary nailing on 9/26  Reason for Therapy: Mobility Dysf and amb w/o AD prior to surgery.  Recommend sub-acute rehab at hospital d/c.     DISCHARGE RECOMMENDATIONS  PT Discharge Recommendations: Sub-acute rehabilitation    PLAN  PT Treatment Plan: Bed mobility;Transfer training;Gait training;Strengthening;Patient OBJECTIVE  Precautions: Limb alert - left;Bed/chair alarm  Fall Risk: High fall risk    WEIGHT BEARING RESTRICTION  Weight Bearing Restriction: L lower extremity           L Lower Extremity: Toe Touch Weight Bearing    PAIN ASSESSMENT  Rating: (did not Patient's self-stated goal is: to go to rehab   Goal #1 Patient is able to demonstrate supine - sit EOB @ level: minimum assistance     Goal #1   Current Status    Goal #2 Patient is able to demonstrate transfers Sit to/from Stand at assistance level: mode

## 2018-09-27 NOTE — ANESTHESIA POSTPROCEDURE EVALUATION
Patient: Bridget Mcbride    Procedure Summary     Date:  09/26/18 Room / Location:  [facility] OR  / [facility] OR    Anesthesia Start:  1921 Anesthesia Stop:  2243    Procedure:  ORIF INTER/SUB TROCH FRACTURE (Left Hip) Diagnosis:  (Intertrochanteric fracture

## 2018-09-27 NOTE — OCCUPATIONAL THERAPY NOTE
OCCUPATIONAL THERAPY EVALUATION - INPATIENT      Room Number: 430/430-A  Evaluation Date: 9/27/2018  Type of Evaluation: Initial  Presenting Problem: (L hip intraclocking IM nail 09/26 )    Physician Order: IP Consult to Occupational Therapy  Reason for Th care/supervision;Sub-acute rehabilitation       PLAN  OT Treatment Plan: Balance activities; Energy conservation/work simplification techniques;ADL training;Functional transfer training;UE strengthening/ROM; Endurance training;Patient/Family education;Patien Bearing    PAIN ASSESSMENT  Rating: Unable to rate(Did not rate on pain scale; reported pain with movement only)  Location: (LLE )  Management Techniques: Repositioning;Relaxation(Pt did not need pain medication at this time )    ACTIVITY TOLERANCE  1LNC a Static Standing: mod. A   Dynamic Standing: mod.  A     FUNCTIONAL ADL ASSESSMENT  Grooming: SBA (seated)   Feeding: n/t   Bathing: n/t   Toileting: total A   Upper Body Dressing: n/t   Lower Body Dressing: total A     Education Provided: Role of OT, bed

## 2018-09-27 NOTE — ANESTHESIA PROCEDURE NOTES
Peripheral IV  Date/Time: 9/26/2018 8:00 PM  Inserted by: Ana Son MD    Placement  Needle size: 18 G  Laterality: left  Location: hand  Local anesthetic: none  Site prep: alcohol  Technique: anatomical landmarks  Attempts: 1

## 2018-09-27 NOTE — CONSULTS
[facility]    PATIENT'S NAME: Maria Fernanda Palumbo   ATTENDING PHYSICIAN: Aureliano Murry MD   CONSULTING PHYSICIAN: Nuno Ellison MD   PATIENT ACCOUNT#:   [id number]    LOCATION:  [address] [place] [address] #:   [id number]       DATE OF BIRTH:  0 lower extremity DVT as per HPI, hyperlipidemia, hypothyroidism, hypertension, renal insufficiency. MEDICATIONS:  Outpatient medications include potassium chloride, levothyroxine, lisinopril, rivaroxaban, 20 mg daily, atenolol, Remeron.     ALLERGIES:  No If there is no active thrombosis, we can consider continuing dose of rivaroxaban 10 mg indefinitely in part based on the [facility] CHOICE.   However, if there is active thrombus, we would likely recommend transitioning her back to 20 mg daily for a period of

## 2018-09-27 NOTE — OPERATIVE REPORT
[facility]    PATIENT'S NAME: Sallie Sargent   ATTENDING PHYSICIAN: Aureliano Perea MD   OPERATING PHYSICIAN: Chato Guardado MD   PATIENT ACCOUNT#:   [id number]    LOCATION:  [address] [place] [address] #:   [id number]       DATE OF NIALL history (and its potential sequelae); increased risk in need of a blood transfusion, given the aggressive use of thromboembolic prophylaxis and her preoperative anemia; the need for prolonged rehabilitation and possible prolonged protective weightbearing; on multiple C-arm fluoroscopic views.   The cannulated awl was advanced across the proximal femur and fracture site, and a ball-tipped guidewire was slightly bent distally, passed through the cannulated awl, across the fracture site, down to the proximal as into place until good position, alignment, and length were noted. Compression of the fracture site was dialed in with the TFN nail, and excellent alignment was noted.   The nail was locked from above by placing a screwdriver in the proximal end of the femu well, having suffered no apparent untoward effects from the anesthetic agent or the procedure itself. The needle and sponge counts were correct.     Dictated By Elaine Fernandez MD  d: 09/26/2018 23:24:05  t: 09/27/2018 00:07:38  [facility] [id number]

## 2018-09-27 NOTE — OR PREOP
Chad Labor Patient Status:  Inpatient    1914 MRN [id number]   Location [facility] PRE OP RECOVERY Attending Glenny Valentino MD   Hosp Day # 3 PCP Ale Baugh MD     Patient is unable to remove jewelry from bilateral ears and finger.

## 2018-09-27 NOTE — PLAN OF CARE
DISCHARGE PLANNING    • Discharge to home or other facility with appropriate resources Progressing        HEMATOLOGIC - ADULT    • Maintains hematologic stability Progressing    • Free from bleeding injury Progressing        Impaired Activities of Daily Relda Fraction LOCKED AND IN LOWEST POSITION, USES CALL LIGHT APPROPRIATELY, GRANDDAUGHTER AT BEDSIDE OVERNIGHT. INCISION TO LEFT HIP COVERED WITH MEDIPORE AND ACE WRAP, C/D/I.  ABRASION TO LEFT SHOULDER COVERED WITH AQUACEL FOAM DRESSING, C/D/I. BRUISING TO BILATERAL HENRY

## 2018-09-27 NOTE — PROGRESS NOTES
[facility]    Progress Note    Sloan Garcia Patient Status:  Inpatient    1914 MRN [id number]   Location [facility] 4W/SW/SE Attending Mi White MD   Hosp Day # 4 PCP Eddie Swanson MD     Date of Admission:  2018 Intravenous Once   [COMPLETED] acetaminophen (TYLENOL) tab 650 mg 650 mg Oral Once   [COMPLETED] diphenhydrAMINE (BENADRYL) cap/tab 25 mg 25 mg Oral Once   [COMPLETED] furosemide (LASIX) injection 20 mg 20 mg Intravenous Once   [COMPLETED] sodium chloride Used    Alcohol use: Not on file    Drug use: Not on file       PHYSICAL EXAM:   /52 (BP Location: Right arm)   Pulse 81   Temp 97.1 °F (36.2 °C) (Oral)   Resp 16   Ht 61\"   Wt 109 lb 12.8 oz   SpO2 96%   BMI 20.75 kg/m²   She is alert, oriented, an displaced fracture--status post interlocking intramedullary femoral nailing    Advanced bone demineralization/osteoporosis  History of DVT with chronic Xarelto  UTI  Anemia--both preop and postop on chronic anticoagulation and with associated thrombocytope

## 2018-09-28 ENCOUNTER — APPOINTMENT (OUTPATIENT)
Dept: ULTRASOUND IMAGING | Facility: HOSPITAL | Age: 83
DRG: 481 | End: 2018-09-28
Attending: INTERNAL MEDICINE
Payer: MEDICARE

## 2018-09-28 PROCEDURE — 99232 SBSQ HOSP IP/OBS MODERATE 35: CPT | Performed by: INTERNAL MEDICINE

## 2018-09-28 PROCEDURE — 93971 EXTREMITY STUDY: CPT | Performed by: INTERNAL MEDICINE

## 2018-09-28 RX ORDER — FUROSEMIDE 20 MG/1
20 TABLET ORAL DAILY
Status: DISCONTINUED | OUTPATIENT
Start: 2018-09-28 | End: 2018-09-30

## 2018-09-28 RX ORDER — 0.9 % SODIUM CHLORIDE 0.9 %
VIAL (ML) INJECTION
Status: COMPLETED
Start: 2018-09-28 | End: 2018-09-28

## 2018-09-28 NOTE — DIETARY NOTE
ADULT NUTRITION INITIAL ASSESSMENT    Pt is at moderate nutrition risk. Pt does not meet malnutrition criteria.       RECOMMENDATIONS TO MD:  See Nutrition Intervention     NUTRITION DIAGNOSIS/PROBLEM:  Inadequate oral intake related to decreased ability to maximize    Food Allergies: No  Cultural/Ethnic/Congregational Preferences: Not Obtained    MEDICATIONS: reviewed  • furosemide  20 mg Oral Daily   • rivaroxaban  10 mg Oral Q24H   • iron sucrose (VENOFER) IVPB >=300 mg  300 mg Intravenous Q24H   • Levothyro

## 2018-09-28 NOTE — PROGRESS NOTES
[facility] HOSP - [facility]    Progress Note    Jadine Pouch Patient Status:  Inpatient    1914 MRN [id number]   Location [facility] 4W/SW/SE Attending Negrito Lara MD   Hosp Day # 5 PCP José Miguel Calderon MD       Subjective:   Jadine Pouch is 23.7*   MCV  95.3   --   92.9  92.9   --   93.6   MCH  31.7   --   30.6  30.6   --   31.4   MCHC  33.2   --   33.0  33.0   --   33.5   RDW  16.6*   --   16.5*  16.5*   --   17.0*   WBC  8.8   --   15.9*  15.9*   --   10.9   PLT  100*   --   77*  77*   -- Estimated GFR units: mL/min/1.73 square meters  eGFR calculated by the CKD-EPI equation. 09/23/2018 47 (L) >=60 Final     Comment:       Estimated GFR units: mL/min/1.73 square meters  eGFR calculated by the CKD-EPI equation.    08/13/2018 43 (L) >= 9/27/2018  CONCLUSION:  Intraoperative fluoroscopy was utilized.     Dictated by (CST): Kim Howell MD on 9/27/2018 at 6:32     Approved by (CST): Kim Howell MD on 9/27/2018 at 6:32          Xr Hip W Or Wo Pelvis 2 Or 3 Views, Left (cpt=73502)    R

## 2018-09-28 NOTE — PROGRESS NOTES
[facility]    Progress Note    Avelina Lefort Patient Status:  Inpatient    1914 MRN [id number]   Location [facility] 4W/SW/SE Attending Cuauhtemoc Khan MD   Hosp Day # 5 PCP Chico Espinal MD     Date of Admission:  2018 sucrose (VENOFER) 300 mg in sodium chloride 0.9 % 250 mL IVPB 300 mg Intravenous Q24H   [COMPLETED] 0.9%  NaCl infusion  Intravenous Once   [COMPLETED] acetaminophen (TYLENOL) tab 650 mg 650 mg Oral Once   [COMPLETED] diphenhydrAMINE (BENADRYL) cap/tab 25 INTER/SUB TROCH FRACTURE;  Left      Comment:  Performed by Raissa Pang MD at [address] [place] [address] MAIN OR   Social History:  Social History    Tobacco Use      Smoking status: Never Smoker      Smokeless tobacco: Never Used    Alcohol use: Not on file    Drug use: N Xr C-arm Fluoro >1 Hour  (cpt=76001)    Result Date: 9/27/2018  CONCLUSION:  Intraoperative fluoroscopy was utilized.     Dictated by (CST): Joyce Calhoun MD on 9/27/2018 at 6:32     Approved by (CST): Joyce Calhoun MD on 9/27/2018 at 6:32

## 2018-09-28 NOTE — OCCUPATIONAL THERAPY NOTE
OCCUPATIONAL THERAPY TREATMENT NOTE - INPATIENT    Room Number: 430/430-A         Presenting Problem: (L hip intraclocking IM nail 09/26 )     Problem List  Principal Problem:    Closed fracture of left hip, initial encounter [facility])  Active Problems:    Jazmín education    SUBJECTIVE  \"I remember you! \" (re: working with pt yesterday)     OBJECTIVE  Precautions: Limb alert - left;Bed/chair alarm    WEIGHT BEARING RESTRICTION  Weight Bearing Restriction: L lower extremity           L Lower Extremity: Toe Touch W transfer skills   Patient End of Session: Up in chair;Needs met;Call light within reach;RN aware of session/findings; All patient questions and concerns addressed; Alarm set;SCDs in place    OT Goals:    Patient self-stated goal is: Recover from surgery, reg

## 2018-09-28 NOTE — PHYSICAL THERAPY NOTE
PHYSICAL THERAPY HIP TREATMENT NOTE - INPATIENT    Room Number: 430/430-A            Presenting Problem: L hip intramedullary nailing on 9/26    Problem List  Principal Problem:    Closed fracture of left hip, initial encounter [facility])  Active Problems:    C Ratin  Location: L hip with activity.  0/10 at rest  Management Techniques: Repositioning    BALANCE  Static Sitting: Fair  Dynamic Sitting: Fair -  Static Standing: Poor +  Dynamic Standing: Poor -  ACTIVITY TOLERANCE  O2 Saturation: 97%  Room air  N 2   Goal #3 Patient is able to ambulate 15 feet with assist device: walker - rolling at assistance level: minimum assistance   Goal #3   Current Status Max A x 2 with RW 5ft RW   Goal #4 Patient to demonstrate independence with home activity/exercise instr

## 2018-09-28 NOTE — PROGRESS NOTES
[facility] HOSP - [facility]    Hematology/Oncology   Progress Note    Nando Coyle Patient Status:  Inpatient    1914 MRN [id number]   Location [facility] 4W/SW/SE Attending Gloria Pastrana, [facility] Day # 5 PCP Jas Connor MD     Subject placement with fluoroscopic guidance. Dictated by (CST): Brandi Busch MD on 9/27/2018 at 6:29     Approved by (CST): Brandi Busch MD on 9/27/2018 at 6:31              Medications reviewed.     Assessment and Plan:  8-year-old female with a histor

## 2018-09-28 NOTE — PLAN OF CARE
DISCHARGE PLANNING    • Discharge to home or other facility with appropriate resources Progressing        HEMATOLOGIC - ADULT    • Maintains hematologic stability Progressing    • Free from bleeding injury Progressing        Impaired Activities of Daily Alray Stable

## 2018-09-28 NOTE — CM/SW NOTE
[facility] they have a bed available for when the patient is medically stable to DC.       Umang Diehl, MSW, x. [phone]    Co-signed by MAUDE Anderson

## 2018-09-28 NOTE — PLAN OF CARE
DISCHARGE PLANNING    • Discharge to home or other facility with appropriate resources Progressing        HEMATOLOGIC - ADULT    • Maintains hematologic stability Progressing    • Free from bleeding injury Progressing        Impaired Activities of Daily [place]

## 2018-09-28 NOTE — PHYSICAL THERAPY NOTE
PHYSICAL THERAPY HIP TREATMENT NOTE - INPATIENT    Room Number: 430/430-A            Presenting Problem: L hip intramedullary nailing on 9/26    Problem List  Principal Problem:    Closed fracture of left hip, initial encounter [facility])  Active Problems:    C ASSESSMENT   Rating: (did not rate)  Location: LLE with activity- grimacing  Management Techniques:  Activity promotion;Repositioning    BALANCE  Static Sitting: Fair  Dynamic Sitting: Fair -  Static Standing: Poor +  Dynamic Standing: Poor -    AM-PAC '6-C device: walker - rolling at assistance level: minimum assistance   Goal #3   Current Status  Mod A x 2 with RW 6ft RW   Goal #4 Patient to demonstrate independence with home activity/exercise instructions provided to patient in preparation for discharge.

## 2018-09-29 RX ORDER — HEPARIN SODIUM 5000 [USP'U]/ML
5000 INJECTION, SOLUTION INTRAVENOUS; SUBCUTANEOUS EVERY 12 HOURS SCHEDULED
Status: DISCONTINUED | OUTPATIENT
Start: 2018-09-29 | End: 2018-09-30

## 2018-09-29 RX ORDER — 0.9 % SODIUM CHLORIDE 0.9 %
VIAL (ML) INJECTION
Status: DISPENSED
Start: 2018-09-29 | End: 2018-09-30

## 2018-09-29 RX ORDER — FUROSEMIDE 10 MG/ML
20 INJECTION INTRAMUSCULAR; INTRAVENOUS ONCE
Status: COMPLETED | OUTPATIENT
Start: 2018-09-29 | End: 2018-09-29

## 2018-09-29 NOTE — PROGRESS NOTES
[facility]    Progress Note    Lisandro Alter Patient Status:  Inpatient    1914 MRN [id number]   Location [facility] 4W/SW/SE Attending Maria L Acosta MD   Hosp Day # 6 PCP Kev Turk MD     Date of Admission:  2018 Q6H PRN   [COMPLETED] Vancomycin HCl (VANCOCIN) 750 mg in sodium chloride 0.9 % 250 mL IVPB 15 mg/kg Intravenous Once   [COMPLETED] furosemide (LASIX) injection 20 mg 20 mg Intravenous Once   iron sucrose (VENOFER) 300 mg in sodium chloride 0.9 % 250 mL IV Medical History:  Past Medical History:   Diagnosis Date   • Essential hypertension    • History of blood clots    • Thyroid disease       Surgical History:  Past Surgical History:  9/26/2018: ORIF INTER/SUB TROCH FRACTURE;  Left      Comment:  Performed by PLAN:   Left intertrochanteric comminuted and displaced fracture--status post interlocking intramedullary femoral nailing     GI bleeding--occult blood in stools  Advanced bone demineralization/osteoporosis  History of DVT with chronic Xarelto  UTI  Anemia

## 2018-09-29 NOTE — CONSULTS
[facility] HOSP - [facility]    Report of Consultation    Jadine Pouch Patient Status:  Inpatient    1914 MRN [id number]   Location [facility] 4W/SW/SE Attending Negrito Lara MD   Hosp Day # 6 PCP José Miguel Calderon MD     Date of Admission:   (XARELTO) tab 10 mg 10 mg Oral Q24H   furosemide (LASIX) tab 20 mg 20 mg Oral Daily   Normal Saline Flush 0.9 % injection 10 mL 10 mL Intravenous PRN   acetaminophen (TYLENOL) tab 650 mg 650 mg Oral Q4H PRN   Or      HYDROcodone-acetaminophen (Wai Saliva) 5-325 denies any unusual skin lesions or rashes  EYES: no visual complaints or deficits  HEENT: denies mouth sores  RESPIRATORY: no shortness of breath   CARDIOVASCULAR:no chest pain   GI: Refer to HPI,   : no hematuria  MUSCULOSKELETAL: s/p hip surgery, boo right leg deep venous embolus is after completing 2 months of anticoagulants  TECHNIQUE: Color duplex Doppler venous ultrasound of the right lower extremity was performed in the usual manner.   FINDINGS: Right Common femoral, superficial femoral and poplite reduction. TECHNIQUE:   Single view. FINDINGS:  CARDIAC/VASC: Normal.  No cardiac silhouette abnormality or cardiomegaly. Unremarkable pulmonary vasculature. MEDIAST/NAY:   Large retrocardiac hiatal hernia again noted.  LUNGS/PLEURA: Normal.  No signi 3 Views, Left (cpt=73502)    Result Date: 9/23/2018  PROCEDURE: XR HIP W OR WO PELVIS 2 OR 3 VIEWS, LEFT (CPT=73502)  COMPARISON: None. INDICATIONS: Left hip pain post fall today. TECHNIQUE: AP pelvis and two views left hip.   FINDINGS:  BONES: There is a MD    9/29/2018

## 2018-09-29 NOTE — PHYSICAL THERAPY NOTE
PHYSICAL THERAPY HIP TREATMENT NOTE - INPATIENT    Room Number: 430/430-A            Presenting Problem: s/p fall, LT hip IMN, POD#3    Problem List  Principal Problem:    Closed fracture of left hip, initial encounter [facility])  Active Problems:    Closed fra arms (e.g., wheelchair, bedside commode, etc.): A Lot   -   Moving from lying on back to sitting on the side of the bed?: A Lot   How much help from another person does the patient currently need. ..   -   Moving to and from a bed to a chair (including a wh instructions provided to patient in preparation for discharge.    Goal #4   Current Status  ongoing

## 2018-09-29 NOTE — PLAN OF CARE
DISCHARGE PLANNING    • Discharge to home or other facility with appropriate resources Progressing        HEMATOLOGIC - ADULT    • Maintains hematologic stability Progressing    • Free from bleeding injury Progressing        Impaired Activities of Daily Calixto Pinedo

## 2018-09-29 NOTE — PROGRESS NOTES
[facility]    Progress Note    Geovanny Sickle Patient Status:  Inpatient    1914 MRN [id number]   Location [facility] 4W/SW/SE Attending Ebenezer Zayas MD   Hosp Day # 6 PCP Bam White MD       Subjective:   Geovanny Sickle is --    RDW  16.6*   --   16.5*  16.5*   --   17.0*   --    WBC  8.8   --   15.9*  15.9*   --   10.9   --    PLT  100*   --   77*  77*   --   114*   --        Recent Labs   Lab  09/24/18   0445  09/25/18   0521  09/26/18   0523  09/28/18   0524   GLU  194*

## 2018-09-30 VITALS
HEART RATE: 92 BPM | RESPIRATION RATE: 18 BRPM | SYSTOLIC BLOOD PRESSURE: 131 MMHG | OXYGEN SATURATION: 92 % | HEIGHT: 61 IN | DIASTOLIC BLOOD PRESSURE: 48 MMHG | TEMPERATURE: 99 F | BODY MASS INDEX: 20.73 KG/M2 | WEIGHT: 109.81 LBS

## 2018-09-30 RX ORDER — PANTOPRAZOLE SODIUM 40 MG/1
40 TABLET, DELAYED RELEASE ORAL
Qty: 30 TABLET | Refills: 0 | Status: SHIPPED | OUTPATIENT
Start: 2018-09-30

## 2018-09-30 RX ORDER — BISACODYL 10 MG
10 SUPPOSITORY, RECTAL RECTAL
Status: DISCONTINUED | OUTPATIENT
Start: 2018-09-30 | End: 2018-09-30

## 2018-09-30 RX ORDER — POLYETHYLENE GLYCOL 3350 17 G/17G
17 POWDER, FOR SOLUTION ORAL DAILY
Status: DISCONTINUED | OUTPATIENT
Start: 2018-09-30 | End: 2018-09-30

## 2018-09-30 RX ORDER — FUROSEMIDE 20 MG/1
20 TABLET ORAL DAILY
Qty: 30 TABLET | Refills: 0 | Status: SHIPPED | OUTPATIENT
Start: 2018-09-30

## 2018-09-30 RX ORDER — DOCUSATE SODIUM 100 MG/1
100 CAPSULE, LIQUID FILLED ORAL 2 TIMES DAILY
Status: DISCONTINUED | OUTPATIENT
Start: 2018-09-30 | End: 2018-09-30

## 2018-09-30 RX ORDER — BISACODYL 10 MG
10 SUPPOSITORY, RECTAL RECTAL
Qty: 15 SUPPOSITORY | Refills: 0 | Status: SHIPPED | OUTPATIENT
Start: 2018-09-30

## 2018-09-30 RX ORDER — POLYETHYLENE GLYCOL 3350 17 G/17G
17 POWDER, FOR SOLUTION ORAL DAILY
Qty: 30 EACH | Refills: 0 | Status: SHIPPED | OUTPATIENT
Start: 2018-09-30

## 2018-09-30 RX ORDER — PANTOPRAZOLE SODIUM 40 MG/1
40 TABLET, DELAYED RELEASE ORAL
Status: DISCONTINUED | OUTPATIENT
Start: 2018-09-30 | End: 2018-09-30

## 2018-09-30 RX ORDER — ACETAMINOPHEN 325 MG/1
650 TABLET ORAL EVERY 4 HOURS PRN
Qty: 60 TABLET | Refills: 0 | Status: SHIPPED | OUTPATIENT
Start: 2018-09-30

## 2018-09-30 RX ORDER — PSEUDOEPHEDRINE HCL 30 MG
100 TABLET ORAL 2 TIMES DAILY
Qty: 60 CAPSULE | Refills: 0 | Status: SHIPPED | OUTPATIENT
Start: 2018-09-30

## 2018-09-30 RX ORDER — POTASSIUM CHLORIDE 20 MEQ/1
40 TABLET, EXTENDED RELEASE ORAL EVERY 4 HOURS
Status: DISCONTINUED | OUTPATIENT
Start: 2018-09-30 | End: 2018-09-30

## 2018-09-30 NOTE — PLAN OF CARE
DISCHARGE PLANNING    • Discharge to home or other facility with appropriate resources Progressing        HEMATOLOGIC - ADULT    • Maintains hematologic stability Progressing    • Free from bleeding injury Progressing        Impaired Activities of Daily Kael Dian

## 2018-09-30 NOTE — OCCUPATIONAL THERAPY NOTE
OCCUPATIONAL THERAPY TREATMENT NOTE - INPATIENT    Room Number: 430/430-A         Presenting Problem: (L hip intraclocking IM nail 09/26 )     Problem List  Principal Problem:    Closed fracture of left hip, initial encounter [facility])  Active Problems:    Jazmín (not rated but points to left leg - inconsistent)  Location: (LLE)  Management Techniques: Repositioning;Relaxation(Did not need pain medication at this time )     ACTIVITY TOLERANCE  On RA  Limited in standing d/t endurance     ACTIVITIES OF DAILY LIVING mobility and toilet transfer this date         Patient will complete toilet transfer with min. A x1 w/RW and adhere to LLE TTWB.   Comment: Bed to chair-mod.  A 1; 2nd person to assist with LLE TTWB  - SPT     Patient will complete self care tasks standing

## 2018-09-30 NOTE — PHYSICAL THERAPY NOTE
PHYSICAL THERAPY TREATMENT NOTE - INPATIENT     Room Number: 430/430-A       Presenting Problem: s/p fall, LT hip IMN, POD#3    Problem List  Principal Problem:    Closed fracture of left hip, initial encounter [facility])  Active Problems:    Closed fracture of standing up from a chair with arms (e.g., wheelchair, bedside commode, etc.): A Lot   -   Moving from lying on back to sitting on the side of the bed?: A Lot   How much help from another person does the patient currently need. ..   -   Moving to and from a

## 2018-09-30 NOTE — PROGRESS NOTES
09/30/18 1100   Provider Notification   Reason for Communication (dc planning)   Provider Name Martínez Paredes MD   Method of Communication Face to face   Response See orders   Notification Time 1100   Discussed in person plan for dc to rehab today a

## 2018-09-30 NOTE — PROGRESS NOTES
[facility]  Gastroenterology Progress Note    Mustapha Calabrese Patient Status:  Inpatient    1914 MRN [id number]   Location [facility] 4W/SW/SE Attending Travon Castro MD   Hosp Day # 7 PCP Bebo Enrique MD     Subjective:  Mustapha Calabrese surprising. She is on [facility] for DVT prophylaxis. They further do not want to pursue invasive procedures unless absolutely necessary.  This is reasonable given advanced age.  -No plans to pursue endoscopy at this time.  -Will trend CBC, recheck tomorrow, can co

## 2018-09-30 NOTE — PROGRESS NOTES
[facility]    Progress Note    Brandie Pérez Patient Status:  Inpatient    1914 MRN [id number]   Location [facility] 4W/SW/SE Attending Lora Spaulding MD   Hosp Day # 7 PCP Jessikaliborio Gentile MD     Date of Admission:  2018 5-325 MG per tab 2 tablet 2 tablet Oral Q4H PRN   morphINE sulfate (PF) 2 MG/ML injection 1 mg 1 mg Intravenous Q2H PRN   Or      morphINE sulfate (PF) 2 MG/ML injection 2 mg 2 mg Intravenous Q2H PRN   Or      morphINE sulfate (PF) 4 MG/ML injection 4 mg 4 Q15 Min PRN   glucose (DEX4) oral liquid 15 g 15 g Oral Q15 Min PRN   Insulin Aspart Pen (NOVOLOG) 100 UNIT/ML flexpen 1-5 Units 1-5 Units Subcutaneous TID CC   [COMPLETED] Sodium Chloride 0.9 % solution      [COMPLETED] sodium chloride 0.9 % infusion 09/24/2018    INR 1.5 (H) 09/24/2018    PTP 17.6 (H) 09/24/2018    TSH 1.82 09/24/2018    B12 330 09/28/2018         IMAGING:              ASSESSMENT AND PLAN:   Left intertrochanteric comminuted and displaced fracture--status post interlocking intramedull

## 2018-09-30 NOTE — CM/SW NOTE
RN/Una informed NEHAL that pt is medically stable to discharge today and can transport via [employer].  NEHAL spoke w/ Gaby from [facility] and arranged medicar transport to [place] [facility].     NEHAL spoke w/ pt's daughter Airam Sellers [phone] who has been info

## 2018-09-30 NOTE — PROGRESS NOTES
Patient awake and alert, sitting in chair comfortable  Hgb today 8 post iron  She is being transferred to [facility] for rehab

## 2018-09-30 NOTE — PROGRESS NOTES
[facility]    Progress Note    Wilberrai Noyola Patient Status:  Inpatient    1914 MRN [id number]   Location [facility] 4W/SW/SE Attending Rodger Agustin MD   Hosp Day # 7 PCP Livia Briones MD       Subjective:   Wilber Noyola is --    --    WBC  8.8   --   15.9*  15.9*   --   10.9   --    --    --    PLT  100*   --   77*  77*   --   114*   --    --    --     < > = values in this interval not displayed.        Recent Labs   Lab  09/24/18   0445   09/26/18   9775  09/28/18   0333 Lucian Arzate MD  9/30/2018

## 2018-10-18 ENCOUNTER — TELEPHONE (OUTPATIENT)
Dept: HEMATOLOGY/ONCOLOGY | Facility: HOSPITAL | Age: 83
End: 2018-10-18

## 2018-10-18 NOTE — TELEPHONE ENCOUNTER
Katie Ritchie the patient daughter called and said for now she like to hold off on the post hospital follow up. She said that her mom is in Rehab in [address] and hopefully in about 3 to 4 weeks more she will be discharged.  She is asking to call back and make t

## 2018-10-24 NOTE — DISCHARGE SUMMARY
[facility]    PATIENT'S NAME: Ernesto Whyte   ATTENDING PHYSICIAN: Aureliano Mina MD   PATIENT ACCOUNT#:   [id number]    LOCATION:  [address] [place] [address] RECORD #:   [id number]       YOB: 1914  ADMISSION DATE:       09/23/201 findings. X-ray of the pelvis reveals a left intertrochanteric left hip fracture. EKG reveals low voltage of the limb leads but unchanged from 2009. CONSULTATIONS:  Kellie Denise M.D., and Derek Hinds. Constantino Zepeda M.D.     PROCEDURES AND DATES:  The 0

## 2018-11-01 ENCOUNTER — APPOINTMENT (OUTPATIENT)
Dept: HEMATOLOGY/ONCOLOGY | Facility: HOSPITAL | Age: 83
End: 2018-11-01
Attending: INTERNAL MEDICINE
Payer: MEDICARE

## 2018-11-01 PROBLEM — S72.142D CLOSED DISPLACED INTERTROCHANTERIC FRACTURE OF LEFT FEMUR WITH ROUTINE HEALING: Status: ACTIVE | Noted: 2018-09-23

## 2019-01-10 ENCOUNTER — LAB REQUISITION (OUTPATIENT)
Dept: LAB | Facility: HOSPITAL | Age: 84
End: 2019-01-10
Payer: MEDICARE

## 2019-01-10 DIAGNOSIS — N18.30 CHRONIC KIDNEY DISEASE, STAGE III (MODERATE) (HCC): ICD-10-CM

## 2019-01-10 LAB
ANION GAP SERPL CALC-SCNC: 11 MMOL/L (ref 0–18)
BUN SERPL-MCNC: 54 MG/DL (ref 8–20)
BUN/CREAT SERPL: 44.3 (ref 10–20)
CALCIUM SERPL-MCNC: 8.9 MG/DL (ref 8.5–10.5)
CHLORIDE SERPL-SCNC: 106 MMOL/L (ref 95–110)
CO2 SERPL-SCNC: 22 MMOL/L (ref 22–32)
CREAT SERPL-MCNC: 1.22 MG/DL (ref 0.5–1.5)
GLUCOSE SERPL-MCNC: 162 MG/DL (ref 70–99)
OSMOLALITY UR CALC.SUM OF ELEC: 306 MOSM/KG (ref 275–295)
POTASSIUM SERPL-SCNC: 4 MMOL/L (ref 3.3–5.1)
SODIUM SERPL-SCNC: 139 MMOL/L (ref 136–144)

## 2019-01-10 PROCEDURE — 80048 BASIC METABOLIC PNL TOTAL CA: CPT | Performed by: INTERNAL MEDICINE

## 2019-05-14 ENCOUNTER — LAB ENCOUNTER (OUTPATIENT)
Dept: LAB | Facility: HOSPITAL | Age: 84
End: 2019-05-14
Attending: INTERNAL MEDICINE
Payer: MEDICARE

## 2019-05-14 DIAGNOSIS — B99.9 INFECTION: ICD-10-CM

## 2019-05-14 DIAGNOSIS — R35.89 POLYURIA: Primary | ICD-10-CM

## 2019-05-14 PROCEDURE — 85025 COMPLETE CBC W/AUTO DIFF WBC: CPT

## 2019-05-14 PROCEDURE — 36415 COLL VENOUS BLD VENIPUNCTURE: CPT

## 2019-05-14 PROCEDURE — 81015 MICROSCOPIC EXAM OF URINE: CPT

## 2019-05-14 PROCEDURE — 80048 BASIC METABOLIC PNL TOTAL CA: CPT

## 2021-07-14 DIAGNOSIS — N93.9 VAGINAL BLEEDING: ICD-10-CM

## 2021-07-14 DIAGNOSIS — D50.0 IRON DEFICIENCY ANEMIA DUE TO CHRONIC BLOOD LOSS: Primary | ICD-10-CM

## 2021-07-16 ENCOUNTER — LAB ENCOUNTER (OUTPATIENT)
Dept: LAB | Facility: HOSPITAL | Age: 86
End: 2021-07-16
Attending: INTERNAL MEDICINE
Payer: MEDICARE

## 2021-07-16 DIAGNOSIS — N93.9 VAGINAL BLEEDING: ICD-10-CM

## 2021-07-16 DIAGNOSIS — D50.0 IRON DEFICIENCY ANEMIA DUE TO CHRONIC BLOOD LOSS: ICD-10-CM

## 2021-07-16 LAB
BASOPHILS # BLD AUTO: 0.02 X10(3) UL (ref 0–0.2)
BASOPHILS NFR BLD AUTO: 0.2 %
DEPRECATED RDW RBC AUTO: 59.3 FL (ref 35.1–46.3)
EOSINOPHIL # BLD AUTO: 0.06 X10(3) UL (ref 0–0.7)
EOSINOPHIL NFR BLD AUTO: 0.6 %
ERYTHROCYTE [DISTWIDTH] IN BLOOD BY AUTOMATED COUNT: 15.9 % (ref 11–15)
HCT VFR BLD AUTO: 32.1 %
HGB BLD-MCNC: 10 G/DL
IMM GRANULOCYTES # BLD AUTO: 0.03 X10(3) UL (ref 0–1)
IMM GRANULOCYTES NFR BLD: 0.3 %
LYMPHOCYTES # BLD AUTO: 3.08 X10(3) UL (ref 1–4)
LYMPHOCYTES NFR BLD AUTO: 29.6 %
MCH RBC QN AUTO: 32.1 PG (ref 26–34)
MCHC RBC AUTO-ENTMCNC: 31.2 G/DL (ref 31–37)
MCV RBC AUTO: 102.9 FL
MONOCYTES # BLD AUTO: 1.5 X10(3) UL (ref 0.1–1)
MONOCYTES NFR BLD AUTO: 14.4 %
NEUTROPHILS # BLD AUTO: 5.73 X10 (3) UL (ref 1.5–7.7)
NEUTROPHILS # BLD AUTO: 5.73 X10(3) UL (ref 1.5–7.7)
NEUTROPHILS NFR BLD AUTO: 54.9 %
PLATELET # BLD AUTO: 173 10(3)UL (ref 150–450)
RBC # BLD AUTO: 3.12 X10(6)UL
WBC # BLD AUTO: 10.4 X10(3) UL (ref 4–11)

## 2021-07-16 PROCEDURE — 85025 COMPLETE CBC W/AUTO DIFF WBC: CPT

## 2021-07-16 PROCEDURE — 36415 COLL VENOUS BLD VENIPUNCTURE: CPT

## (undated) DEVICE — REM POLYHESIVE ADULT PATIENT RETURN ELECTRODE: Brand: VALLEYLAB

## (undated) DEVICE — INTENDED FOR TISSUE SEPARATION, AND OTHER PROCEDURES THAT REQUIRE A SHARP SURGICAL BLADE TO PUNCTURE OR CUT.: Brand: BARD-PARKER ® STAINLESS STEEL BLADES

## (undated) DEVICE — Device

## (undated) DEVICE — GAUZE SPONGES,12 PLY: Brand: CURITY

## (undated) DEVICE — STERILE LATEX POWDER-FREE SURGICAL GLOVESWITH NITRILE COATING: Brand: PROTEXIS

## (undated) DEVICE — TRAY SRGPRP PVP IOD WT SCRB SM

## (undated) DEVICE — BATTERY

## (undated) DEVICE — 3M™ STERI-STRIP™ COMPOUND BENZOIN TINCTURE 40 BAGS/CARTON 4 CARTONS/CASE C1544: Brand: 3M™ STERI-STRIP™

## (undated) DEVICE — TOWEL OR BLU 16X26 STRL

## (undated) DEVICE — DRAIN INCS 3/16IN 49IN SIL

## (undated) DEVICE — SUTURE VICRYL 2-0 FS-1

## (undated) DEVICE — DRILL BIT 4.2/3-FLTD CALIB

## (undated) DEVICE — 6617 IOBAN II PATIENT ISOLATION DRAPE 5/BX,4BX/CS: Brand: STERI-DRAPE™ IOBAN™ 2

## (undated) DEVICE — 3M™ IOBAN™ 2 ANTIMICROBIAL INCISE DRAPE 6650EZ: Brand: IOBAN™ 2

## (undated) DEVICE — 3M™ MICROFOAM™ TAPE 1528-4: Brand: 3M™ MICROFOAM™

## (undated) DEVICE — PROXIMATE SKIN STAPLERS (35 WIDE) CONTAINS 35 STAINLESS STEEL STAPLES (FIXED HEAD): Brand: PROXIMATE

## (undated) DEVICE — BANDAGE ROLL,100% COTTON, 6 PLY, LARGE: Brand: KERLIX

## (undated) DEVICE — GOWN SURG AERO BLUE PERF LG

## (undated) DEVICE — STERILE TETRA-FLEX CF, ELASTIC BANDAGE LATEX FREE 6IN X5.5 YD: Brand: TETRA-FLEX™CF

## (undated) DEVICE — NAIL GAMMA TARGET DEVICE

## (undated) DEVICE — GUIDEWIRE SYNT 3.2X400 357.399

## (undated) DEVICE — ROD RM 950MM 2.5MM BALL TIP GW

## (undated) DEVICE — SOL  .9 1000ML BTL

## (undated) DEVICE — SUCTION CANISTER, 3000CC,SAFELINER: Brand: DEROYAL

## (undated) DEVICE — PEN: MARKING STD PT 100/CS: Brand: MEDICAL ACTION INDUSTRIES

## (undated) DEVICE — SPONGE: LAP 18X18 PW 200/CS: Brand: NOVAPLUS®

## (undated) DEVICE — 3M™ STERI-STRIP™ REINFORCED ADHESIVE SKIN CLOSURES, R1547, 1/2 IN X 4 IN (12 MM X 100 MM), 6 STRIPS/ENVELOPE: Brand: 3M™ STERI-STRIP™

## (undated) DEVICE — ABDOMINAL PAD: Brand: CURITY

## (undated) DEVICE — UNDYED BRAIDED (POLYGLACTIN 910), SYNTHETIC ABSORBABLE SUTURE: Brand: COATED VICRYL

## (undated) DEVICE — STERILE LATEX POWDER-FREE SURGICAL GLOVES WITH HYDROGEL COATING, SMOOTH FINISH, STRAIGHT FINGER: Brand: PROTEXIS

## (undated) DEVICE — SUTURE VICRYL 3-0 J497G

## (undated) DEVICE — SUTURE VICRYL 0 CP-1

## (undated) DEVICE — 3M™ TEGADERM™ TRANSPARENT FILM DRESSING, 1626W, 4 IN X 4-3/4 IN (10 CM X 12 CM), 50 EACH/CARTON, 4 CARTON/CASE: Brand: 3M™ TEGADERM™

## (undated) DEVICE — COVER SGL STRL LGHT HNDL BLU

## (undated) NOTE — IP AVS SNAPSHOT
Patient Demographics     Address  [address] [place] [address] [place]on Gaviria [address] Phone  [phone] [facility])  [phone] (Mobile) *Preferred*      Emergency Contact(s)     Name Relation Home [place] Daughter [phone] all Commonly known as:  MILK OF MAGNESIA  Next dose due: Tomorrow am       Take 30 mL by mouth daily as needed for constipation. Albaro Ellis MD         Metoprolol Succinate ER 25 MG Tb24  Commonly known as:   Toprol XL  Next dose due:  TOMORROW MORNING [id number] docusate sodium (COLACE) cap 100 mg 09/30/18 1027 Given      [id number] furosemide (LASIX) tab 20 mg 09/30/18 1027 Given      [id number] iron sucrose (VENOFER) 300 mg in sodium chloride 0.9 % 250 mL IVPB 09/29/18 1515 New Bag      [id number] irene Creatinine 0.97 0.50 - 1.50 mg/dL — [facility]   Calcium, Total 8.3 8.5 - 10.5 mg/dL  [facility]   BUN/CREA Ratio 23.7 10.0 - 20.0 [facility]   Anion Gap 6 0 - 18 mmol/L — [facility]   Calculated Osmolality 302 275 - 295 mOsm/kg [facility] Gentamicin <=1  Sensitive    Levofloxacin <=0.12  Sensitive    Meropenem <=0.25  Sensitive    Nitrofurantoin <=16  Sensitive    Piperacillin + Tazobactam <=4  Sensitive    Trimethoprim/Sulfa <=20  Sensitive                   MRSA/SA Scrn by PCR:Emergent O and Xarelto 20 mg at supper (active treatment for recent DVT). ALLERGIES:  No known. SOCIAL HISTORY:  Habits: The patient does not smoke, drink, or take illicit drugs.   The patient is  and lives by herself in [address] [place] [address] 4.   Hypertension, benign. 5.   Chronic renal insufficiency, stage 3B. 6.   Personal history of thrombophlebitis. 7.   Hypothyroidism. 8.   Glucose intolerance. 9.   Presumed urinary tract infection pending urine culture. 10.   Thrombocytopenia. Anemia, heme positive stools    History of Present Illness:   Patient is a[NM.3 8 year old[NM.2] female who was admitted to the hospital for hip fracture s/p[NM.1] repair on 9/26. GI consulted for anemia and hgb drop.  Patient has a baseline anemia of 8- HYDROcodone-acetaminophen (NORCO) 5-325 MG per tab 1 tablet 1 tablet Oral Q4H PRN   Or      HYDROcodone-acetaminophen (NORCO) 5-325 MG per tab 2 tablet 2 tablet Oral Q4H PRN   morphINE sulfate (PF) 2 MG/ML injection 1 mg 1 mg Intravenous Q2H PRN   Or : no hematuria  MUSCULOSKELETAL:[NM.1] s/p hip surgery, swelling[NM.3]  NEURO: no focal weakness or numbness  PSYCHE: no depression or anxiety  HEMATOLOGIC: no easy bruising  ENDOCRINE: no temperature intolerance    Physical Exam:[NM.1]   Blood pressure performed in the usual manner. FINDINGS: Right Common femoral, superficial femoral and popliteal veins appear patent. Mild residual intimal wall thickening suggesting chronic sequela from prior DVT recanalization. .  Normal flow was demonstrated with color vasculature. MEDIAST/NAY:   Large retrocardiac hiatal hernia again noted. LUNGS/PLEURA: Normal.  No significant pulmonary parenchymal abnormalities. No effusion or pleural thickening. BONES: No fracture or visible bony lesion. OTHER: Negative.        CON COMPARISON: None. INDICATIONS: Left hip pain post fall today. TECHNIQUE: AP pelvis and two views left hip.   FINDINGS:  BONES: There is a comminuted intertrochanteric fracture of the proximal left femur with varus angulation of the main fracture fragments NM.1 - Yisroel Dubin, MD on 9/29/2018 10:18 AM  NM. 2 - Yisroel Dubin, MD on 9/29/2018 10:19 AM  NM. 3 - Yisroel Dubin, MD on 9/29/2018 11:12 AM                     D/C Summary     No notes of this type exist for this encounter.          Physical Therapy N Precautions: Limb alert - left;Bed/chair alarm    WEIGHT BEARING RESTRICTION  Weight Bearing Restriction: L lower extremity           L Lower Extremity: Toe Touch Weight Bearing    PAIN ASSESSMENT   Rating: Unable to rate  Location: L hip with activity.  0/ Goals to be met by: 10/11/18  Patient Goal Patient's self-stated goal is: to go to rehab   Goal #1 Patient is able to demonstrate supine - sit EOB @ level: minimum assistance      Goal #1   Current Status Max a x1 for supine _> sit transfer   Goal #2 Mook 2 for sit<>stand- cues for appropriate UE positioning with transitional movements- pt having to slide LLE forward prior to transition. Patient expressed fatigue from a.m. PT session and requesting to return to bed.  Tolerated 2 minutes of static standing wi -   Sitting down on and standing up from a chair with arms (e.g., wheelchair, bedside commode, etc.): A Lot   -   Moving from lying on back to sitting on the side of the bed?: A Lot[TL.2]   How much help from another person does the patient currently need. TL.2 Vinny Sanabria, PT on 9/28/2018  4:56 PM               Physical Therapy Note signed by Moustapha Denise PT at 9/28/2018  1:58 PM  Version 1 of 1    Author:  Moustapha Denise PT Service:  Physical Medicine and Rehabilitation Author Type:  Shani Gunn PT Treatment Plan: Bed mobility;Transfer training;Gait training;Strengthening;Patient education; Body mechanics; Family education; Endurance; Energy conservation    SUBJECTIVE  \"Did they tell you how good I did yesterday? The girl said she was impressed! \" Goals to be met by: 10/11/18  Patient Goal Patient's self-stated goal is: to go to rehab   Goal #1 Patient is able to demonstrate supine - sit EOB @ level: minimum assistance      Goal #1   Current Status  Mod A x 1   Goal #2 Patient is able to demonstrate status. [ST.1] Consulted w/ RN prior to seeing pt for PT/OT co-eval. Pt was received supine in bed. Pt was educated on TTWB status on LLE. Pt completed bed mobility w/ Max A x 2 for supine to sit transfer.  Initially, pt needed Mod A for sitting balance, but lissette on the floor, bent over to pick it up, fell, and was unable to stand afterward. Paramedics were summoned and patient was brought to the emergency room for further evaluation and treatment.   She was found to have an intertrochanteric left hip fractu Static Sitting: Poor +  Dynamic Sitting: Poor  Static Standing: Poor +  Dynamic Standing: Poor +[ST. 2]    ACTIVITY TOLERANCE[ST.1]  Room air[ST. 3]    AM-PAC '6-Clicks' INPATIENT SHORT FORM - BASIC MOBILITY  How much difficulty does the patient currently ha Goal #3 Patient is able to ambulate[ST.1] 15[ST.3] feet with assist device:[ST.1] walker - rolling[ST.3] at assistance level:[ST.1] minimum assistance[ST. 3]   Goal #3   Current Status    Goal #4 Patient to demonstrate independence with home activity/exerci up when moving in chair - set up on toilet with pt complaining of constipation - RN aware - per patient she did not sleep well all night d/t frequent urination - max a for donning depends over feet - max a in standing to pull over hips - attempted to take Standardized Score (AM-PAC Scale): 33.39  CMS Modifier (G-Code): CK    FUNCTIONAL TRANSFER ASSESSMENT  Supine to Sit : Maximum assistance  Sit to Stand:  Moderate assistance(assist of another as needed )  Toilet Transfer: use of rolling commode - mod a x 1 Filed:  9/28/2018  3:54 PM Date of Service:  9/28/2018 12:03 PM Status:  Signed    :  Soni Franklin OT (Occupational Therapist)       OCCUPATIONAL THERAPY TREATMENT NOTE - INPATIENT    Room Number: 430/430-A[MS.1]         Presenting Problem: (L hip i simplification techniques;ADL training;Functional transfer training;UE strengthening/ROM; Endurance training;Patient/Family education;Patient/Family training;Equipment eval/education; Compensatory technique education[MS. 2]    SUBJECTIVE  \"I remember you! \" Dynamic Standing: mod.  A x1; 2nd person to assist pt to adhere to LLE TTWB    FUNCTIONAL ADL ASSESSMENT  Grooming: ADLs n/t due to focus was on bed mobility and transfer to chair; too fatigued after   Bathing: n/t   Toileting: n/t   Upper Body Dressing: n/ Presenting Problem: (L hip intraclocking IM nail 09/26 )    Physician Order: IP Consult to Occupational Therapy  Reason for Therapy: ADL/IADL Dysfunction and Discharge Planning    OCCUPATIONAL THERAPY ASSESSMENT     Patient is a 8 year old female admitte simplification techniques;ADL training;Functional transfer training;UE strengthening/ROM; Endurance training;Patient/Family education;Patient/Family training;Equipment eval/education; Compensatory technique education       OCCUPATIONAL THERAPY MEDICAL/SOCIAL Location: (E )  Management Techniques: Repositioning;Relaxation(Pt did not need pain medication at this time )    ACTIVITY TOLERANCE  1LNC at rest; 97%, 92 HR (was not using O2 at home)  RA with activity: 94%, 97HR  82/49, pt has low BP    COGNITION  Ove Bathing: n/t   Toileting: total A   Upper Body Dressing: n/t   Lower Body Dressing: total A     Education Provided: Role of OT, bed mobility, transfer skills, LLE TTWB     Patient End of Session: Up in chair;Needs met;Call light within reach;RN aware of se

## (undated) NOTE — IP AVS SNAPSHOT
[facility]            (For Outpatient Use Only) Initial Admit Date: 9/23/2018   Inpt/Obs Admit Date: Inpt: 9/23/18 / Obs: N/A   Discharge Date:    Chloe Kraft:  [de-identified]   MRN: [de-identified]   CSN: [id number]        ENCOUNTER  Patient Class Subscriber ID:  Pt Rel to Subscriber:    Hospital Account Financial Class: Medicare    September 30, 2018